# Patient Record
Sex: FEMALE | Race: OTHER | HISPANIC OR LATINO | URBAN - METROPOLITAN AREA
[De-identification: names, ages, dates, MRNs, and addresses within clinical notes are randomized per-mention and may not be internally consistent; named-entity substitution may affect disease eponyms.]

---

## 2020-03-02 ENCOUNTER — EMERGENCY (EMERGENCY)
Facility: HOSPITAL | Age: 75
LOS: 1 days | Discharge: HOME | End: 2020-03-02
Admitting: EMERGENCY MEDICINE
Payer: MEDICARE

## 2020-03-02 VITALS
WEIGHT: 134.92 LBS | HEART RATE: 86 BPM | TEMPERATURE: 98 F | HEIGHT: 62 IN | DIASTOLIC BLOOD PRESSURE: 77 MMHG | RESPIRATION RATE: 18 BRPM | OXYGEN SATURATION: 94 % | SYSTOLIC BLOOD PRESSURE: 146 MMHG

## 2020-03-02 VITALS
RESPIRATION RATE: 17 BRPM | OXYGEN SATURATION: 95 % | SYSTOLIC BLOOD PRESSURE: 137 MMHG | TEMPERATURE: 98 F | HEART RATE: 79 BPM | DIASTOLIC BLOOD PRESSURE: 79 MMHG

## 2020-03-02 LAB
FLU A RESULT: SIGNIFICANT CHANGE UP
FLU A RESULT: SIGNIFICANT CHANGE UP
FLUAV AG NPH QL: SIGNIFICANT CHANGE UP
FLUBV AG NPH QL: SIGNIFICANT CHANGE UP
RSV RESULT: SIGNIFICANT CHANGE UP
RSV RNA RESP QL NAA+PROBE: SIGNIFICANT CHANGE UP

## 2020-03-02 PROCEDURE — 73620 X-RAY EXAM OF FOOT: CPT | Mod: 26,RT

## 2020-03-02 PROCEDURE — 99283 EMERGENCY DEPT VISIT LOW MDM: CPT

## 2020-03-02 RX ORDER — IBUPROFEN 200 MG
600 TABLET ORAL ONCE
Refills: 0 | Status: COMPLETED | OUTPATIENT
Start: 2020-03-02 | End: 2020-03-02

## 2020-03-02 RX ORDER — ACETAMINOPHEN 500 MG
650 TABLET ORAL ONCE
Refills: 0 | Status: COMPLETED | OUTPATIENT
Start: 2020-03-02 | End: 2020-03-02

## 2020-03-02 RX ADMIN — Medication 600 MILLIGRAM(S): at 13:30

## 2020-03-02 RX ADMIN — Medication 650 MILLIGRAM(S): at 13:31

## 2020-03-02 NOTE — ED PROVIDER NOTE - NSFOLLOWUPINSTRUCTIONS_ED_ALL_ED_FT
Follow up with your primary care doctor or clinics listed below if you do not have a doctor  Return immediately for any new or worsening symptoms or any new concerns

## 2020-03-02 NOTE — ED ADULT NURSE NOTE - OBJECTIVE STATEMENT
Pt presents to ED c/o generalized body aches, headaches, runny shai and wound to right foot side of first digit.

## 2020-03-02 NOTE — ED PROVIDER NOTE - CARE PLAN
Principal Discharge DX:	Bunion of great toe Principal Discharge DX:	Bunion of great toe  Assessment and plan of treatment:	r/o osteo   flu   reassess

## 2020-03-02 NOTE — ED PROVIDER NOTE - CLINICAL SUMMARY MEDICAL DECISION MAKING FREE TEXT BOX
73 y/o female multiple medical complaints  poor historian   Looks well     will r/o flu   multiple cental caries. bunion on right great toe will r/o osteo 75 y/o female multiple medical complaints  poor historian   Looks well     right foot bunion   no swelling describes pain    will r/o flu   multiple cental caries. bunion on right great toe will r/o osteo

## 2020-03-02 NOTE — ED PROVIDER NOTE - PATIENT PORTAL LINK FT
You can access the FollowMyHealth Patient Portal offered by [facility] by registering at the following website: [email or web address]. By joining [employer]’s FollowMyHealth portal, you will also be able to view your health information using other applications (apps) compatible with our system.

## 2020-03-02 NOTE — ED PROVIDER NOTE - OBJECTIVE STATEMENT
75 y/o female denies hx c/o right foot pain, dental pain, and bodyaches with headache for multiple days. Patient states she just arrived from [place] four days ago. denies chest pain,nausea,vomiting,diarrhea,fevers,chills,sob,trauma,loc. Patient is poor historian. unknown if in shelter

## 2020-03-06 DIAGNOSIS — M21.611 BUNION OF RIGHT FOOT: ICD-10-CM

## 2020-03-06 DIAGNOSIS — M79.671 PAIN IN RIGHT FOOT: ICD-10-CM

## 2020-03-06 DIAGNOSIS — R51 HEADACHE: ICD-10-CM

## 2020-03-07 ENCOUNTER — EMERGENCY (EMERGENCY)
Facility: HOSPITAL | Age: 75
LOS: 1 days | Discharge: HOME | End: 2020-03-07
Admitting: EMERGENCY MEDICINE
Payer: MEDICARE

## 2020-03-07 VITALS
OXYGEN SATURATION: 98 % | HEIGHT: 62 IN | HEART RATE: 91 BPM | DIASTOLIC BLOOD PRESSURE: 73 MMHG | RESPIRATION RATE: 18 BRPM | WEIGHT: 134.92 LBS | TEMPERATURE: 99 F | SYSTOLIC BLOOD PRESSURE: 117 MMHG

## 2020-03-07 DIAGNOSIS — M54.2 CERVICALGIA: ICD-10-CM

## 2020-03-07 PROCEDURE — 99284 EMERGENCY DEPT VISIT MOD MDM: CPT

## 2020-03-07 RX ORDER — LIDOCAINE 4 G/100G
1 CREAM TOPICAL ONCE
Refills: 0 | Status: COMPLETED | OUTPATIENT
Start: 2020-03-07 | End: 2020-03-07

## 2020-03-07 RX ORDER — KETOROLAC TROMETHAMINE 30 MG/ML
15 SYRINGE (ML) INJECTION ONCE
Refills: 0 | Status: DISCONTINUED | OUTPATIENT
Start: 2020-03-07 | End: 2020-03-07

## 2020-03-07 RX ORDER — ACETAMINOPHEN 500 MG
1 TABLET ORAL
Qty: 20 | Refills: 0
Start: 2020-03-07

## 2020-03-07 RX ADMIN — LIDOCAINE 1 PATCH: 4 CREAM TOPICAL at 16:27

## 2020-03-07 RX ADMIN — Medication 15 MILLIGRAM(S): at 16:15

## 2020-03-07 NOTE — ED PROVIDER NOTE - CLINICAL SUMMARY MEDICAL DECISION MAKING FREE TEXT BOX
neck pain appears to be similar to be previous episodes, hx herniated discs neck, no numbness or weakness, no neuro/motor deficits. denies trauma.
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
## 2020-03-07 NOTE — ED PROVIDER NOTE - PATIENT PORTAL LINK FT
You can access the FollowMyHealth Patient Portal offered by [facility] by registering at the following website: [email or web address]. By joining [employer]’s FollowMyHealth portal, you will also be able to view your health information using other applications (apps) compatible with our system.

## 2020-03-07 NOTE — ED PROVIDER NOTE - PHYSICAL EXAMINATION
VITAL SIGNS: I have reviewed nursing notes and confirm.  CONSTITUTIONAL: Well-developed; unkempt; in no acute distress.  SKIN: Skin is warm and dry, no acute rash.  ENT: No nasal discharge; airway clear.  NECK: Supple; non tender. No midline cervical tenderness or stepoff.   CARD: S1, S2 normal; no murmurs, gallops, or rubs. Regular rate and rhythm.  RESP: No wheezes, rales or rhonchi.  EXT: Normal ROM x4. No clubbing, cyanosis or edema.  NEURO: Alert, oriented. Grossly unremarkable.  PSYCH: Cooperative, appropriate. VITAL SIGNS: I have reviewed nursing notes and confirm.  	CONSTITUTIONAL: Well-developed; well-nourished; in no acute distress.  	SKIN: Skin is warm and dry, no acute rash.  	HEAD: Normocephalic; atraumatic.  	EYES: PERRL, EOM intact; conjunctiva and sclera clear.  	ENT: No nasal discharge; airway clear.  no tonsillar swelling or exudates, uvula midline, airway patent  	NECK: Supple; no midline  tenderness. +soft tissue paraspinal muscle tenderness. pain reproducible with movement of neck.   	CARD: S1, S2 normal; no murmurs, gallops, or rubs. Regular rate and rhythm.  	RESP: No wheezes, rales or rhonchi.  	ABD: soft; non-distended; non-tender  	EXT: Normal ROM x 4  	NEURO: Alert, oriented. Grossly unremarkable.  PSYCH: Cooperative, appropriate.
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
## 2020-03-07 NOTE — ED PROVIDER NOTE - CARE PROVIDER_API CALL
Michele Nicholas)  Pain Medicine; PhysicalRehab Medicine  [address] [place] [address]  Phone: [phone]  Fax: [phone]  Follow Up Time:

## 2022-05-29 ENCOUNTER — INPATIENT (INPATIENT)
Facility: HOSPITAL | Age: 77
LOS: 2 days | Discharge: NURSING, REHAB OR LONG-TERM CARE | DRG: 603 | End: 2022-06-01
Attending: HOSPITALIST | Admitting: HOSPITALIST
Payer: MEDICARE

## 2022-05-29 VITALS
TEMPERATURE: 98 F | RESPIRATION RATE: 16 BRPM | HEART RATE: 81 BPM | DIASTOLIC BLOOD PRESSURE: 85 MMHG | OXYGEN SATURATION: 99 % | HEIGHT: 62 IN | WEIGHT: 125 LBS | SYSTOLIC BLOOD PRESSURE: 152 MMHG

## 2022-05-29 DIAGNOSIS — Z00.00 ENCOUNTER FOR GENERAL ADULT MEDICAL EXAMINATION WITHOUT ABNORMAL FINDINGS: ICD-10-CM

## 2022-05-29 DIAGNOSIS — L03.90 CELLULITIS, UNSPECIFIED: ICD-10-CM

## 2022-05-29 LAB
ALBUMIN SERPL ELPH-MCNC: 4 G/DL — SIGNIFICANT CHANGE UP (ref 3.3–5)
ALP SERPL-CCNC: 152 U/L — HIGH (ref 40–120)
ALT FLD-CCNC: 25 U/L — SIGNIFICANT CHANGE UP (ref 10–45)
ANION GAP SERPL CALC-SCNC: 10 MMOL/L — SIGNIFICANT CHANGE UP (ref 5–17)
APTT BLD: 28.5 SEC — SIGNIFICANT CHANGE UP (ref 27.5–35.5)
AST SERPL-CCNC: 29 U/L — SIGNIFICANT CHANGE UP (ref 10–40)
BASOPHILS # BLD AUTO: 0.07 K/UL — SIGNIFICANT CHANGE UP (ref 0–0.2)
BASOPHILS NFR BLD AUTO: 0.7 % — SIGNIFICANT CHANGE UP (ref 0–2)
BILIRUB SERPL-MCNC: 0.3 MG/DL — SIGNIFICANT CHANGE UP (ref 0.2–1.2)
BUN SERPL-MCNC: 22 MG/DL — SIGNIFICANT CHANGE UP (ref 7–23)
CALCIUM SERPL-MCNC: 9.5 MG/DL — SIGNIFICANT CHANGE UP (ref 8.4–10.5)
CHLORIDE SERPL-SCNC: 102 MMOL/L — SIGNIFICANT CHANGE UP (ref 96–108)
CO2 SERPL-SCNC: 24 MMOL/L — SIGNIFICANT CHANGE UP (ref 22–31)
CREAT SERPL-MCNC: 0.68 MG/DL — SIGNIFICANT CHANGE UP (ref 0.5–1.3)
EGFR: 91 ML/MIN/1.73M2 — SIGNIFICANT CHANGE UP
EOSINOPHIL # BLD AUTO: 0.08 K/UL — SIGNIFICANT CHANGE UP (ref 0–0.5)
EOSINOPHIL NFR BLD AUTO: 0.9 % — SIGNIFICANT CHANGE UP (ref 0–6)
GLUCOSE SERPL-MCNC: 92 MG/DL — SIGNIFICANT CHANGE UP (ref 70–99)
HCT VFR BLD CALC: 37.6 % — SIGNIFICANT CHANGE UP (ref 34.5–45)
HGB BLD-MCNC: 11.6 G/DL — SIGNIFICANT CHANGE UP (ref 11.5–15.5)
IMM GRANULOCYTES NFR BLD AUTO: 0.6 % — SIGNIFICANT CHANGE UP (ref 0–1.5)
INR BLD: 1.09 — SIGNIFICANT CHANGE UP (ref 0.88–1.16)
LACTATE SERPL-SCNC: 1.3 MMOL/L — SIGNIFICANT CHANGE UP (ref 0.5–2)
LYMPHOCYTES # BLD AUTO: 2.02 K/UL — SIGNIFICANT CHANGE UP (ref 1–3.3)
LYMPHOCYTES # BLD AUTO: 21.6 % — SIGNIFICANT CHANGE UP (ref 13–44)
MCHC RBC-ENTMCNC: 28.8 PG — SIGNIFICANT CHANGE UP (ref 27–34)
MCHC RBC-ENTMCNC: 30.9 GM/DL — LOW (ref 32–36)
MCV RBC AUTO: 93.3 FL — SIGNIFICANT CHANGE UP (ref 80–100)
MONOCYTES # BLD AUTO: 1.02 K/UL — HIGH (ref 0–0.9)
MONOCYTES NFR BLD AUTO: 10.9 % — SIGNIFICANT CHANGE UP (ref 2–14)
NEUTROPHILS # BLD AUTO: 6.09 K/UL — SIGNIFICANT CHANGE UP (ref 1.8–7.4)
NEUTROPHILS NFR BLD AUTO: 65.3 % — SIGNIFICANT CHANGE UP (ref 43–77)
NRBC # BLD: 0 /100 WBCS — SIGNIFICANT CHANGE UP (ref 0–0)
PLATELET # BLD AUTO: 421 K/UL — HIGH (ref 150–400)
POTASSIUM SERPL-MCNC: 4.1 MMOL/L — SIGNIFICANT CHANGE UP (ref 3.5–5.3)
POTASSIUM SERPL-SCNC: 4.1 MMOL/L — SIGNIFICANT CHANGE UP (ref 3.5–5.3)
PROT SERPL-MCNC: 8.5 G/DL — HIGH (ref 6–8.3)
PROTHROM AB SERPL-ACNC: 13 SEC — SIGNIFICANT CHANGE UP (ref 10.5–13.4)
RBC # BLD: 4.03 M/UL — SIGNIFICANT CHANGE UP (ref 3.8–5.2)
RBC # FLD: 13.3 % — SIGNIFICANT CHANGE UP (ref 10.3–14.5)
SARS-COV-2 RNA SPEC QL NAA+PROBE: NEGATIVE — SIGNIFICANT CHANGE UP
SODIUM SERPL-SCNC: 136 MMOL/L — SIGNIFICANT CHANGE UP (ref 135–145)
WBC # BLD: 9.34 K/UL — SIGNIFICANT CHANGE UP (ref 3.8–10.5)
WBC # FLD AUTO: 9.34 K/UL — SIGNIFICANT CHANGE UP (ref 3.8–10.5)

## 2022-05-29 PROCEDURE — 99223 1ST HOSP IP/OBS HIGH 75: CPT | Mod: GC

## 2022-05-29 PROCEDURE — 99285 EMERGENCY DEPT VISIT HI MDM: CPT | Mod: FS

## 2022-05-29 RX ORDER — FAMOTIDINE 10 MG/ML
20 INJECTION INTRAVENOUS ONCE
Refills: 0 | Status: DISCONTINUED | OUTPATIENT
Start: 2022-05-29 | End: 2022-05-29

## 2022-05-29 RX ORDER — FAMOTIDINE 10 MG/ML
20 INJECTION INTRAVENOUS DAILY
Refills: 0 | Status: DISCONTINUED | OUTPATIENT
Start: 2022-05-29 | End: 2022-06-01

## 2022-05-29 RX ORDER — DIPHENHYDRAMINE HCL 50 MG
25 CAPSULE ORAL ONCE
Refills: 0 | Status: DISCONTINUED | OUTPATIENT
Start: 2022-05-29 | End: 2022-05-29

## 2022-05-29 RX ORDER — ACETAMINOPHEN 500 MG
975 TABLET ORAL ONCE
Refills: 0 | Status: COMPLETED | OUTPATIENT
Start: 2022-05-29 | End: 2022-05-29

## 2022-05-29 RX ORDER — DIPHENHYDRAMINE HCL 50 MG
25 CAPSULE ORAL ONCE
Refills: 0 | Status: COMPLETED | OUTPATIENT
Start: 2022-05-29 | End: 2022-05-29

## 2022-05-29 RX ORDER — VANCOMYCIN HCL 1 G
1250 VIAL (EA) INTRAVENOUS ONCE
Refills: 0 | Status: COMPLETED | OUTPATIENT
Start: 2022-05-29 | End: 2022-05-29

## 2022-05-29 RX ORDER — PIPERACILLIN AND TAZOBACTAM 4; .5 G/20ML; G/20ML
3.38 INJECTION, POWDER, LYOPHILIZED, FOR SOLUTION INTRAVENOUS ONCE
Refills: 0 | Status: COMPLETED | OUTPATIENT
Start: 2022-05-29 | End: 2022-05-29

## 2022-05-29 RX ORDER — ACETAMINOPHEN 500 MG
650 TABLET ORAL EVERY 6 HOURS
Refills: 0 | Status: DISCONTINUED | OUTPATIENT
Start: 2022-05-29 | End: 2022-06-01

## 2022-05-29 RX ORDER — INFLUENZA VIRUS VACCINE 15; 15; 15; 15 UG/.5ML; UG/.5ML; UG/.5ML; UG/.5ML
0.7 SUSPENSION INTRAMUSCULAR ONCE
Refills: 0 | Status: DISCONTINUED | OUTPATIENT
Start: 2022-05-29 | End: 2022-06-01

## 2022-05-29 RX ADMIN — Medication 975 MILLIGRAM(S): at 19:57

## 2022-05-29 RX ADMIN — Medication 1250 MILLIGRAM(S): at 21:04

## 2022-05-29 RX ADMIN — Medication 975 MILLIGRAM(S): at 20:56

## 2022-05-29 RX ADMIN — PIPERACILLIN AND TAZOBACTAM 3.38 GRAM(S): 4; .5 INJECTION, POWDER, LYOPHILIZED, FOR SOLUTION INTRAVENOUS at 20:56

## 2022-05-29 RX ADMIN — Medication 25 MILLIGRAM(S): at 21:16

## 2022-05-29 RX ADMIN — FAMOTIDINE 20 MILLIGRAM(S): 10 INJECTION INTRAVENOUS at 21:16

## 2022-05-29 RX ADMIN — PIPERACILLIN AND TAZOBACTAM 200 GRAM(S): 4; .5 INJECTION, POWDER, LYOPHILIZED, FOR SOLUTION INTRAVENOUS at 19:57

## 2022-05-29 RX ADMIN — Medication 166.67 MILLIGRAM(S): at 20:55

## 2022-05-29 NOTE — H&P ADULT - PROBLEM SELECTOR PLAN 5
F: None   E: replet as needed   N: regular   DVT ppx:   GI ppx: Famotidine  Dispo: [facility]  CODE: DNR/DNI F: None   E: replet as needed   N: regular   DVT ppx: Hold Xarelto and ASA pending CT leg to r/o abscess and decision by podiatry   GI ppx: Famotidine  Dispo: [facility]  CODE: DNR/DNI F: None   E: replet as needed   N: regular   DVT ppx: Hold Xarelto and ASA pending CT leg to r/o abscess and decision by podiatry   GI ppx: Famotidine  Dispo: [facility]    GOC: FULL CODE in the chart from nursing home but pt requested to review MOLTS to make a decission for being DNR/DNI. Patient is not reliable to decesion and right now pt is FULL CODE until sign the MOLTS and being seen by psych in AM.

## 2022-05-29 NOTE — H&P ADULT - PROBLEM SELECTOR PLAN 1
Hx of venous stasis and non pressure ulcer and cellulitis and ? osteomyelitis for unknown duration, was getting CTX start date 5/23 for 10 days by [facility] . Presented to the ED due to increase pain and unable to walk. reported fever at rehab but in ED, afebrie and no leukocytosis. Do not meet SIRS criteria. elevated CRP and ESR on admission.   Was seen by wound care ppls in rehab, per wound care note: non pressure venous ulcer on Left leg anterion, medial upper, medial lower, lateral left leg treated with santyl, mupirocin, alginate and CTX 1gr daily   -f/u BCX   -f/u wound culture   -s/p zosyn and vanc in ED but had skin itchiness/redness and cold during vanc infusion, so vanc stopped. Pt requested PO antibiotics -------  -c/w Bactrim ans zosyn  -f/u Podiatry recs in AM  -ID consult if needed in AM  -Pt refused to do the CT leg and xray leg in ED. will attempt in AM Hx of venous stasis and non pressure ulcer and cellulitis and ? osteomyelitis for unknown duration, was getting CTX start date 5/23 for 10 days by [facility] . Presented to the ED due to increase pain and unable to walk. reported fever at rehab but in ED, afebrie and no leukocytosis. Do not meet SIRS criteria. elevated CRP and ESR on admission.   Was seen by wound care ppls in rehab, per wound care note: non pressure venous ulcer on Left leg anterion, medial upper, medial lower, lateral left leg treated with santyl, mupirocin, alginate and CTX 1gr daily   -f/u BCX   -f/u wound culture   -s/p zosyn and vanc in ED but had skin itchiness/redness and cold during vanc infusion reported, so vanc stopped in the ED. Pt requested PO antibiotics isntead of IV antibiotics.     INCOMPELET+++++++      -c/w Bactrim/vanc  ans zosyn (cefepim if denies )  -f/u Podiatry recs in AM  -ID consult if needed in AM  -Pt refused to do the CT leg and xray leg in ED. will attempt in AM    Nec fasc, she does not allow us to assess  one dose clindamycin   Vascular call Hx of venous stasis and non pressure ulcer and cellulitis and ? osteomyelitis for unknown duration, was getting CTX start date 5/23 for 10 days by [facility] . Presented to the ED due to increase pain and unable to walk. reported fever at rehab but in ED, afebrie and no leukocytosis. Do not meet SIRS criteria. elevated CRP and ESR on admission.   Was seen by wound care ppls in rehab, per wound care note: non pressure venous ulcer on Left leg anterion, medial upper, medial lower, lateral left leg treated with santyl, mupirocin, alginate and CTX 1gr daily   -f/u BCX   -f/u wound culture   -s/p zosyn and vanc in ED but had skin itchiness/redness and cold during vanc infusion reported, so vanc stopped in the ED. Pt requested PO antibiotics isntead of IV antibiotics. Patient did not have sever reaction to Vanc, no SOB, no CP, no dizziness, HA at that time.   - Due to need for coverage MRSA wounds mallodure and puss will c/w Vancomycin RUN IT SLOW AND GIVE IV BENADRYL BEFORE VANC INFUSION.   - Cefepim 2gr q8hrs and ID consult for approval in AM   -f/u Podiatry recs (consulted)  -F/u Vascular surgery recs (consulted)  -Due to Pt refused to do the CT leg and xray leg in ED and leg exam limited due to pain and pt uncooperation, unable to r/o Fasc necrotizing/cripticus at this time, pending Vascular exam   -Clindamycin  ?????? Hx of venous stasis and non pressure ulcer and cellulitis and ? osteomyelitis for unknown duration, was getting CTX start date 5/23 for 10 days by [facility] . Presented to the ED due to increase pain and unable to walk. reported fever at rehab but in ED, afebrie and no leukocytosis. Do not meet SIRS criteria. elevated CRP and ESR on admission.   Was seen by wound care ppls in rehab, per wound care note: non pressure venous ulcer on Left leg anterion, medial upper, medial lower, lateral left leg treated with santyl, mupirocin, alginate and CTX 1gr daily   -f/u BCX  and wound culture sent by surgery  -s/p zosyn and vanc in ED but had skin itchiness/redness and cold during vanc infusion reported, so vanc stopped in the ED. Pt requested PO antibiotics isntead of IV antibiotics. Patient did not have sever reaction to Vanc, no SOB, no CP, no dizziness, HA at that time.   - Due to need for coverage MRSA wounds malodure and purulent wound, will c/w Vancomycin   - Vancomycin: RUN IT SLOW in 2 hours AND GIVE IV BENADRYL BEFORE VANC INFUSION.   - Cefepim 2gr q8hrs. One dose given pending ID consult approval in AM   -s/p Clindamycin 900mg IVP one dose till r/o Nec fasci  -f/u Podiatry recs (consulted)  -Vascular surgery on bedside, sent wound culture changed dressing. F/u Vascular surgery recs (consulted)  -Pt refused the CT leg and xray leg

## 2022-05-29 NOTE — H&P ADULT - NSHPPHYSICALEXAM_GEN_ALL_CORE
Constitutional: female/male, WDWN, NAD  HEENT: PERRL, EOMI, sclera non-icteric, neck supple, trachea midline, no masses, no JVD, MMM, good dentition  Respiratory: CTA b/l, good air entry b/l, no wheezing, no rhonchi, no rales, without accessory muscle use and no intercostal retractions  Cardiovascular: RRR, normal S1S2, no M/R/G  Gastrointestinal: soft, NTND, no masses palpable, BS normal  Extremities: Warm, well perfused, pulses equal bilateral upper and lower extremities, no edema, no clubbing  Neurological: AAOx3, CN Grossly intact  Skin: Normal temperature, warm, dry      LLE - multiple ulcers to left leg. left medial lower, left medial upper, left lateral leg. left medial lower leg with surrounding redness, warm to touch. active drainage from wounds to lower leg, purulent drainage, foul smelling. tenderness throughout, no crepitus on exam. 2+ DP pulses. good cap refill. Constitutional: female, WDWN, NAD  HEENT: PERRL, EOMI, sclera non-icteric, neck supple, poor dentition  Respiratory: CTA b/l, good air entry b/l, no wheezing, no rhonchi, no rales, without accessory muscle use and no intercostal retractions  Cardiovascular: Systolic murmur which is chronic to pt, normal S1S2, RRR  Gastrointestinal: soft, NTND, no masses palpable, BS normal  Extremities: Warm, well perfused, pulses equal bilateral upper and lower extremities, no edema, no clubbing  LLE, patient asked to do not touch her leg for examination.  visual exam: multiple ulcers to left leg. left medial lower, left medial upper, left lateral leg. left medial lower leg with surrounding redness,  wet and purulent drainage dressing, foul smelling. Unable to evaluate rest of exam due to pt denied for exam  Neurological: AAOx3, CN Grossly intact Constitutional: female, WDWN, NAD  HEENT: PERRL, EOMI, sclera non-icteric, neck supple, poor dentition  Respiratory: CTA b/l, good air entry b/l, no wheezing, no rhonchi, no rales, without accessory muscle use and no intercostal retractions  Cardiovascular: Systolic murmur which is chronic to pt, normal S1S2, RRR  Gastrointestinal: soft, NTND, no masses palpable, BS normal  Extremities: Warm, well perfused, pulses very weak PD, skin very thight and edematous. at first she refused exam, limited exam, after surgery came and morphine given, dressing was removed and   large size wet purulent wound foul smell and  multiple ulcers to left leg. left medial lower, left medial upper, left lateral leg. left medial lower leg with surrounding redness. No crepitus found on exam  Neurological: AAOx3, CN Grossly intact

## 2022-05-29 NOTE — H&P ADULT - NSHPLABSRESULTS_GEN_ALL_CORE
.  LABS:                         11.6   9.34  )-----------( 421      ( 29 May 2022 19:01 )             37.6     05-29    136  |  102  |  22  ----------------------------<  92  4.1   |  24  |  0.68    Ca    9.5      29 May 2022 19:01    TPro  8.5<H>  /  Alb  4.0  /  TBili  0.3  /  DBili  x   /  AST  29  /  ALT  25  /  AlkPhos  152<H>  05-29    PT/INR - ( 29 May 2022 19:01 )   PT: 13.0 sec;   INR: 1.09          PTT - ( 29 May 2022 19:01 )  PTT:28.5 sec          Lactate, Blood: 1.3 mmol/L (05-29 @ 19:01)      RADIOLOGY, EKG & ADDITIONAL TESTS: Reviewed.

## 2022-05-29 NOTE — ED PROVIDER NOTE - PROGRESS NOTE DETAILS
wbc count ok, ,   will consult podiatry.   pt having diffuse itching after getting vancofredricko dc'd given benadryl / pepcid.  confirmed no allergies listed multiple places on her rehab records.  written for IV meds but pt requesting oral.   reports she wants the same antibiotics she has been getting reviewed w podiatry. agree for admission for iv abx - broad spectrum  will see in morning.   for dressing - telfa non-adherant and kerlix.

## 2022-05-29 NOTE — H&P ADULT - NSHPSOCIALHISTORY_GEN_ALL_CORE
smokes 6-7 cigarres/day, drink ETOh occasionally    From psych note from rehab: Pt was born in [place]. [place] in [place] little kid, adopted and has no family because she was adopted. She's been  and has 3 kids, 2 works in [place] and one works in [employer]. She went to [employer], and [employer] graduated with bachor's degree. 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 smokes 6-7 cigarres/day, drink ETOh occasionally

## 2022-05-29 NOTE — ED ADULT NURSE REASSESSMENT NOTE - NS ED NURSE REASSESS COMMENT FT1
pt developed itching/rashes to back, chest and arm 10 mins after Vancomycin was started via IV pump; denies sob, no wheezing; evaluated by CARLOS White  - Benadryl PO and pepcid PO given as ordered; Vancomycin d/c'd at this time; will continue to monitor

## 2022-05-29 NOTE — H&P ADULT - PROBLEM SELECTOR PLAN 4
AAox4.  was seen by psych at rehab, has been stable.   Melatonin for isomnia AAox4.  was seen by psych at rehab, has been stable. From psych note from rehab: Pt was born in [place]. [place] in [place] little kid, adopted and has no family because she was adopted. She's been  and has 3 kids, 2 works in [place] and one works in [employer]. She went to [employer], and [employer] [place] graduated with bachor's degree. 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   -Psych winnieal in AM   -Melatonin for insomnia PRN AAox4.  was seen by psych at rehab, has been stable. Patient is very difficult to deal with.  From psych note from rehab: Pt was born in [place]. [place] in [place] as little kid, adopted and has no family because she was adopted. She's been  and has 3 kids, 2 works in [place] and one works in [employer]. She went to [employer], and [employer] graduated with bachor's degree. 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   -Psych eval in AM   -Melatonin for insomnia PRN

## 2022-05-29 NOTE — ED PROVIDER NOTE - CARDIAC, MLM
Normal rate, regular rhythm.  extremities warm and well perfused. 2+ radial and DP pulses. good cap refill.

## 2022-05-29 NOTE — ED PROVIDER NOTE - MUSCULOSKELETAL, MLM
range of motion is not limited, no muscle or joint tenderness. see above for LLE exam. PICC line in place to LUE.

## 2022-05-29 NOTE — ED PROVIDER NOTE - OBJECTIVE STATEMENT
76 yr old female, history of osteo, adjustment disorder, presents to the Emergency Department with leg wounds. Pt has multiple wounds to LLE reported to be non-pressure ulcers, no hx of diabetes noted.   [facility] has been on IV ceftriaxone daily since 5/23.   in last 24-48 hours pt w increased pain, subjective fevers, foul smell from wounds.   Pt reports she has been doing wound care as instructed.   Per note exam from rehab doctor on 5/26 wounds had scant amount of drainage, no odor.

## 2022-05-29 NOTE — H&P ADULT - PROBLEM SELECTOR PLAN 3
Plt 421, Hb 11.6 on admission   Home med :Xarelto 10mg daily Plt 421, Hb 11.6 on admission   Home med :Xarelto 10mg daily, ASA 81 daily  -hold home meds pending CT leg to f/o abscess and making dicission by podiatry for debridement Plt 421, Hb 11.6 on admission   Home med :Xarelto 10mg daily, ASA 81 daily  -hold home meds pending CT leg to f/o abscess and making decision by podiatry for debridement  -hold Plt 421, Hb 11.6 on admission . unclear where is the thrombophlebitis  Home med :Xarelto 10mg daily, ASA 81 daily  -hold home meds Xarelto and ASA, pending CT leg to f/o abscess and making decision by surgery and podiatry for debridement  -hold LE US doppler due to pain and pt is not cooperative

## 2022-05-29 NOTE — ED PROVIDER NOTE - CLINICAL SUMMARY MEDICAL DECISION MAKING FREE TEXT BOX
pt w known osteo now here w increased pain, foul smell, subjective fevers despite outpt abx regimen. no crepitus on exam, not concerned for nec fasc  not febrile, vitals ok. pt overall well appearing.  will get XR   labs w esr / crp.   plan for admission for broad spectrum abx.   will consult podiatry for recs.

## 2022-05-29 NOTE — PATIENT PROFILE ADULT - FALL HARM RISK - HARM RISK INTERVENTIONS

## 2022-05-29 NOTE — ED ADULT TRIAGE NOTE - CHIEF COMPLAINT QUOTE
Pt sent in from nursing home for left foot infection x months. Pt receiving antibiotics via PICC line but reports no relief of symptoms. Unable to ambulate. Denies fevers, vomiting.

## 2022-05-29 NOTE — H&P ADULT - HISTORY OF PRESENT ILLNESS
76 yr old female, history of osteo, adjustment disorder, presents to the Emergency Department with leg wounds. Pt has multiple wounds to LLE reported to be non-pressure ulcers, no hx of diabetes noted.   	[facility] has been on IV ceftriaxone daily since 5/23.   	in last 24-48 hours pt w increased pain, subjective fevers, foul smell from wounds.   	Pt reports she has been doing wound care as instructed.   Per note exam from rehab doctor on 5/26 wounds had scant amount of drainage, no odor    wbc count ok, ,   will consult podiatry.   pt having diffuse itching after getting vanco, vanco dc'd given benadryl / pepcid.  confirmed no allergies listed multiple places on her rehab records.  written for IV meds but pt requesting oral.   reports she wants the same antibiotics she has been getting.   reviewed w podiatry. agree for admission for iv abx - broad spectrum  will see in morning.   for dressing - telfa non-adherant and kerlix    ED course:   VS:   WBC 9.34, Hb 11.6, Plt 421, Na 136, K 4.1, BUN 22, Cr 0.68, Pr 8.5 Alkph 152, .7 , [place] 1.3,   Imaging:  Treatment:    Pt is 76 yr old female, history of osteomyelitis (unclear for how long), adjustment disorder/anxiety, presents to the Emergency Department [facility], with leg wounds. Pt has multiple wounds to LLE reported to be non-pressure ulcers. Patient is very difficult to get information. 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 No hx of diabetes noted. Per notes, she has been on IV ceftriaxone daily with PICCLIne since 5/23 total 10 days. In last 24-48 hours pt with increased pain, subjective fevers, foul smell from wounds. Pt reports she has been doing wound care as instructed. Per rehab documents, wound care used to see her at rehab and all three wounds on Left anterior leg/left medical upper leg, left medial lower leg and left lateral leg was treated by  Santyl, mupirocin and alginate.  Per note exam from rehab doctor on 5/26 wounds had scant amount of drainage, no odor  -In the ED when pt was getting vancomycin, she was having diffuse itching and redness and cold, vanco dc'd given benadryl / pepcid. In the ED, confirmed no allergies listed multiple places on her rehab records.    -In the ED, podiatry consulted and reviewed w podiatry. They agree for admission for iv abx - broad spectrum under medicine and they will see pt in AM. for dressing - telfa non-adherant and kerlix    Patient denies HA, vision changes, SOB, CP, abdominal pain, urinary/BM changes. Just has complain of itchiness,   ED course:   VS: Tmax: 98.5, HR 81 /85, RR 16/99% RA.   WBC 9.34, Hb 11.6, Plt 421, Na 136, K 4.1, BUN 22, Cr 0.68, Pr 8.5 Alkph 152, .7 , [place] 1.3,   Imaging: Was not done in the ED, since pt refused.   Treatment: S/p Vanc 1250 and Zosyn in ED , tylenol, Benadryl for reaction to vanc.    Pt is 76 yr old female, history of osteomyelitis (unclear for how long), adjustment disorder/anxiety, presents to the Emergency Department from [facility], with leg wounds. Pt has multiple wounds to LLE reported to be non-pressure ulcers. Patient is very difficult to get information. 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 No hx of diabetes noted. Per notes, she has been on IV ceftriaxone daily with PICCLIne since 5/23 total 10 days. In last 24-48 hours pt with increased pain, subjective fevers, foul smell from wounds. Pt reports she has been doing wound care as instructed. Per rehab documents, wound care used to see her at rehab and all three wounds on Left anterior leg/left medical upper leg, left medial lower leg and left lateral leg was treated by  Santyl, mupirocin and alginate.  Per note exam from rehab doctor on 5/26 wounds had scant amount of drainage, no odor  -In the ED when pt was getting vancomycin, she was having diffuse itching and redness and cold, vanco dc'd given benadryl / pepcid. In the ED, confirmed no allergies listed multiple places on her rehab records.    -In the ED, podiatry consulted and reviewed w podiatry. They agree for admission for iv abx - broad spectrum under medicine and they will see pt in AM. for dressing - telfa non-adherant and kerlix    Patient denies HA, vision changes, SOB, CP, abdominal pain, urinary/BM changes. Just has complain of itchiness,   ED course:   VS: Tmax: 98.5, HR 81 /85, RR 16/99% RA.   WBC 9.34, Hb 11.6, Plt 421, Na 136, K 4.1, BUN 22, Cr 0.68, Pr 8.5 Alkph 152, .7 , [place] 1.3,   Imaging: Was not done in the ED, since pt refused. Refused ECG   Treatment: S/p Vanc 1250 and Zosyn in ED , tylenol, Benadryl for reaction to vanc.

## 2022-05-29 NOTE — ED ADULT NURSE NOTE - OBJECTIVE STATEMENT
Pt has multiple ulcers that are foul smelling to bilateral LE, reports getting treatment at [facility] "but it's not working" also reports taking tylenol "around the clock but I still keep getting a fever."

## 2022-05-29 NOTE — ED PROVIDER NOTE - NS ED ATTENDING STATEMENT MOD
This was a shared visit with the CAROLYNN. I reviewed and verified the documentation and independently performed the documented:

## 2022-05-29 NOTE — H&P ADULT - ATTENDING COMMENTS
#RLE Cellulitis: multile RLE wounds, malodorus, elevated ESR/CRP c/f OM; lactate wnl, f/up CK. Low c/f nec fasc based on limited exam however will have vascular asses, +decreased pulses bilaterally. Pt agreeable to Vanc/Cefepime. F/up CT LE, blood cx. Vascular surgery recs.

## 2022-05-29 NOTE — H&P ADULT - ASSESSMENT
Pt is 76 yr old female, history of osteomyelitis (unclear for how long), adjustment disorder off from meds, presents to the Emergency Department from [facility] [place] [facility], with leg wounds was on CTX IV for cellulitis, admitted to the [facility] for worsening Cellulitis management

## 2022-05-29 NOTE — ED PROVIDER NOTE - PHYSICAL EXAMINATION
LLE - multiple ulcers to left leg. left medial lower, left medial upper, left lateral leg. left medial lower leg with surrounding redness, warm to touch. active drainage from wounds to lower leg, purulent drainage, foul smelling. 2+ DP pulses. good cap refill. LLE - multiple ulcers to left leg. left medial lower, left medial upper, left lateral leg. left medial lower leg with surrounding redness, warm to touch. active drainage from wounds to lower leg, purulent drainage, foul smelling. tenderness throughout, no crepitus on exam. 2+ DP pulses. good cap refill.

## 2022-05-30 DIAGNOSIS — F43.22 ADJUSTMENT DISORDER WITH ANXIETY: ICD-10-CM

## 2022-05-30 DIAGNOSIS — I80.9 PHLEBITIS AND THROMBOPHLEBITIS OF UNSPECIFIED SITE: ICD-10-CM

## 2022-05-30 DIAGNOSIS — I87.2 VENOUS INSUFFICIENCY (CHRONIC) (PERIPHERAL): ICD-10-CM

## 2022-05-30 DIAGNOSIS — U07.1 COVID-19: ICD-10-CM

## 2022-05-30 PROBLEM — M50.20 OTHER CERVICAL DISC DISPLACEMENT, UNSPECIFIED CERVICAL REGION: Chronic | Status: ACTIVE | Noted: 2020-03-07

## 2022-05-30 LAB
ALBUMIN SERPL ELPH-MCNC: 3.4 G/DL — SIGNIFICANT CHANGE UP (ref 3.3–5)
ALP SERPL-CCNC: 120 U/L — SIGNIFICANT CHANGE UP (ref 40–120)
ALT FLD-CCNC: 18 U/L — SIGNIFICANT CHANGE UP (ref 10–45)
ANION GAP SERPL CALC-SCNC: 9 MMOL/L — SIGNIFICANT CHANGE UP (ref 5–17)
AST SERPL-CCNC: 16 U/L — SIGNIFICANT CHANGE UP (ref 10–40)
BASOPHILS # BLD AUTO: 0.05 K/UL — SIGNIFICANT CHANGE UP (ref 0–0.2)
BASOPHILS NFR BLD AUTO: 0.6 % — SIGNIFICANT CHANGE UP (ref 0–2)
BILIRUB SERPL-MCNC: 0.4 MG/DL — SIGNIFICANT CHANGE UP (ref 0.2–1.2)
BUN SERPL-MCNC: 17 MG/DL — SIGNIFICANT CHANGE UP (ref 7–23)
CALCIUM SERPL-MCNC: 9.1 MG/DL — SIGNIFICANT CHANGE UP (ref 8.4–10.5)
CHLORIDE SERPL-SCNC: 102 MMOL/L — SIGNIFICANT CHANGE UP (ref 96–108)
CK SERPL-CCNC: 57 U/L — SIGNIFICANT CHANGE UP (ref 25–170)
CO2 SERPL-SCNC: 23 MMOL/L — SIGNIFICANT CHANGE UP (ref 22–31)
CREAT SERPL-MCNC: 0.7 MG/DL — SIGNIFICANT CHANGE UP (ref 0.5–1.3)
CRP SERPL-MCNC: 95.8 MG/L — HIGH (ref 0–4)
EGFR: 91 ML/MIN/1.73M2 — SIGNIFICANT CHANGE UP
EOSINOPHIL # BLD AUTO: 0.09 K/UL — SIGNIFICANT CHANGE UP (ref 0–0.5)
EOSINOPHIL NFR BLD AUTO: 1 % — SIGNIFICANT CHANGE UP (ref 0–6)
ERYTHROCYTE [SEDIMENTATION RATE] IN BLOOD: 66 MM/HR — HIGH
GLUCOSE SERPL-MCNC: 104 MG/DL — HIGH (ref 70–99)
GRAM STN FLD: SIGNIFICANT CHANGE UP
HCT VFR BLD CALC: 33.9 % — LOW (ref 34.5–45)
HCV AB S/CO SERPL IA: 0.03 S/CO — SIGNIFICANT CHANGE UP
HCV AB SERPL-IMP: SIGNIFICANT CHANGE UP
HGB BLD-MCNC: 10.7 G/DL — LOW (ref 11.5–15.5)
IMM GRANULOCYTES NFR BLD AUTO: 0.9 % — SIGNIFICANT CHANGE UP (ref 0–1.5)
LYMPHOCYTES # BLD AUTO: 1.49 K/UL — SIGNIFICANT CHANGE UP (ref 1–3.3)
LYMPHOCYTES # BLD AUTO: 17.2 % — SIGNIFICANT CHANGE UP (ref 13–44)
MAGNESIUM SERPL-MCNC: 2 MG/DL — SIGNIFICANT CHANGE UP (ref 1.6–2.6)
MCHC RBC-ENTMCNC: 29.2 PG — SIGNIFICANT CHANGE UP (ref 27–34)
MCHC RBC-ENTMCNC: 31.6 GM/DL — LOW (ref 32–36)
MCV RBC AUTO: 92.6 FL — SIGNIFICANT CHANGE UP (ref 80–100)
MONOCYTES # BLD AUTO: 0.53 K/UL — SIGNIFICANT CHANGE UP (ref 0–0.9)
MONOCYTES NFR BLD AUTO: 6.1 % — SIGNIFICANT CHANGE UP (ref 2–14)
NEUTROPHILS # BLD AUTO: 6.42 K/UL — SIGNIFICANT CHANGE UP (ref 1.8–7.4)
NEUTROPHILS NFR BLD AUTO: 74.2 % — SIGNIFICANT CHANGE UP (ref 43–77)
NRBC # BLD: 0 /100 WBCS — SIGNIFICANT CHANGE UP (ref 0–0)
PHOSPHATE SERPL-MCNC: 3.9 MG/DL — SIGNIFICANT CHANGE UP (ref 2.5–4.5)
PLATELET # BLD AUTO: 401 K/UL — HIGH (ref 150–400)
POTASSIUM SERPL-MCNC: 3.9 MMOL/L — SIGNIFICANT CHANGE UP (ref 3.5–5.3)
POTASSIUM SERPL-SCNC: 3.9 MMOL/L — SIGNIFICANT CHANGE UP (ref 3.5–5.3)
PROT SERPL-MCNC: 7.2 G/DL — SIGNIFICANT CHANGE UP (ref 6–8.3)
RBC # BLD: 3.66 M/UL — LOW (ref 3.8–5.2)
RBC # FLD: 13.3 % — SIGNIFICANT CHANGE UP (ref 10.3–14.5)
SODIUM SERPL-SCNC: 134 MMOL/L — LOW (ref 135–145)
SPECIMEN SOURCE: SIGNIFICANT CHANGE UP
WBC # BLD: 8.66 K/UL — SIGNIFICANT CHANGE UP (ref 3.8–10.5)
WBC # FLD AUTO: 8.66 K/UL — SIGNIFICANT CHANGE UP (ref 3.8–10.5)

## 2022-05-30 PROCEDURE — 99233 SBSQ HOSP IP/OBS HIGH 50: CPT | Mod: GC

## 2022-05-30 PROCEDURE — 73700 CT LOWER EXTREMITY W/O DYE: CPT | Mod: 26,LT

## 2022-05-30 RX ORDER — CEFEPIME 1 G/1
INJECTION, POWDER, FOR SOLUTION INTRAMUSCULAR; INTRAVENOUS
Refills: 0 | Status: DISCONTINUED | OUTPATIENT
Start: 2022-05-30 | End: 2022-05-30

## 2022-05-30 RX ORDER — MORPHINE SULFATE 50 MG/1
1 CAPSULE, EXTENDED RELEASE ORAL ONCE
Refills: 0 | Status: DISCONTINUED | OUTPATIENT
Start: 2022-05-30 | End: 2022-05-30

## 2022-05-30 RX ORDER — PIPERACILLIN AND TAZOBACTAM 4; .5 G/20ML; G/20ML
4.5 INJECTION, POWDER, LYOPHILIZED, FOR SOLUTION INTRAVENOUS EVERY 6 HOURS
Refills: 0 | Status: DISCONTINUED | OUTPATIENT
Start: 2022-05-30 | End: 2022-05-30

## 2022-05-30 RX ORDER — RIVAROXABAN 15 MG-20MG
1 KIT ORAL
Qty: 0 | Refills: 0 | DISCHARGE

## 2022-05-30 RX ORDER — DIPHENHYDRAMINE HCL 50 MG
25 CAPSULE ORAL EVERY 12 HOURS
Refills: 0 | Status: COMPLETED | OUTPATIENT
Start: 2022-05-30 | End: 2022-05-30

## 2022-05-30 RX ORDER — CEFTRIAXONE 500 MG/1
0 INJECTION, POWDER, FOR SOLUTION INTRAMUSCULAR; INTRAVENOUS
Qty: 0 | Refills: 0 | DISCHARGE

## 2022-05-30 RX ORDER — TROLAMINE SALICYLATE 10 %
4 CREAM (GRAM) TOPICAL
Qty: 0 | Refills: 0 | DISCHARGE

## 2022-05-30 RX ORDER — ASPIRIN/CALCIUM CARB/MAGNESIUM 324 MG
0 TABLET ORAL
Qty: 0 | Refills: 0 | DISCHARGE

## 2022-05-30 RX ORDER — HYDROMORPHONE HYDROCHLORIDE 2 MG/ML
0.5 INJECTION INTRAMUSCULAR; INTRAVENOUS; SUBCUTANEOUS ONCE
Refills: 0 | Status: DISCONTINUED | OUTPATIENT
Start: 2022-05-30 | End: 2022-05-30

## 2022-05-30 RX ORDER — MORPHINE SULFATE 50 MG/1
0.5 CAPSULE, EXTENDED RELEASE ORAL
Refills: 0 | Status: DISCONTINUED | OUTPATIENT
Start: 2022-05-30 | End: 2022-05-31

## 2022-05-30 RX ORDER — CEFEPIME 1 G/1
2000 INJECTION, POWDER, FOR SOLUTION INTRAMUSCULAR; INTRAVENOUS EVERY 8 HOURS
Refills: 0 | Status: DISCONTINUED | OUTPATIENT
Start: 2022-05-30 | End: 2022-05-30

## 2022-05-30 RX ORDER — VANCOMYCIN HCL 1 G
1000 VIAL (EA) INTRAVENOUS EVERY 12 HOURS
Refills: 0 | Status: DISCONTINUED | OUTPATIENT
Start: 2022-05-30 | End: 2022-05-30

## 2022-05-30 RX ORDER — COLLAGENASE CLOSTRIDIUM HIST. 250 UNIT/G
1 OINTMENT (GRAM) TOPICAL
Qty: 0 | Refills: 0 | DISCHARGE

## 2022-05-30 RX ORDER — ENOXAPARIN SODIUM 100 MG/ML
40 INJECTION SUBCUTANEOUS EVERY 24 HOURS
Refills: 0 | Status: DISCONTINUED | OUTPATIENT
Start: 2022-05-30 | End: 2022-05-30

## 2022-05-30 RX ORDER — RIVAROXABAN 15 MG-20MG
10 KIT ORAL DAILY
Refills: 0 | Status: DISCONTINUED | OUTPATIENT
Start: 2022-05-31 | End: 2022-06-01

## 2022-05-30 RX ORDER — MUPIROCIN 20 MG/G
1 OINTMENT TOPICAL
Qty: 0 | Refills: 0 | DISCHARGE

## 2022-05-30 RX ORDER — MORPHINE SULFATE 50 MG/1
2 CAPSULE, EXTENDED RELEASE ORAL ONCE
Refills: 0 | Status: DISCONTINUED | OUTPATIENT
Start: 2022-05-30 | End: 2022-05-30

## 2022-05-30 RX ORDER — VANCOMYCIN HCL 1 G
1000 VIAL (EA) INTRAVENOUS EVERY 12 HOURS
Refills: 0 | Status: COMPLETED | OUTPATIENT
Start: 2022-05-30 | End: 2022-05-30

## 2022-05-30 RX ORDER — DIPHENHYDRAMINE HCL 50 MG
25 CAPSULE ORAL EVERY 12 HOURS
Refills: 0 | Status: DISCONTINUED | OUTPATIENT
Start: 2022-05-30 | End: 2022-05-30

## 2022-05-30 RX ORDER — MORPHINE SULFATE 50 MG/1
1 CAPSULE, EXTENDED RELEASE ORAL EVERY 4 HOURS
Refills: 0 | Status: DISCONTINUED | OUTPATIENT
Start: 2022-05-30 | End: 2022-05-31

## 2022-05-30 RX ORDER — ASPIRIN/CALCIUM CARB/MAGNESIUM 324 MG
81 TABLET ORAL DAILY
Refills: 0 | Status: DISCONTINUED | OUTPATIENT
Start: 2022-05-30 | End: 2022-06-01

## 2022-05-30 RX ORDER — LANOLIN ALCOHOL/MO/W.PET/CERES
1 CREAM (GRAM) TOPICAL
Qty: 0 | Refills: 0 | DISCHARGE

## 2022-05-30 RX ADMIN — MORPHINE SULFATE 2 MILLIGRAM(S): 50 CAPSULE, EXTENDED RELEASE ORAL at 03:05

## 2022-05-30 RX ADMIN — FAMOTIDINE 20 MILLIGRAM(S): 10 INJECTION INTRAVENOUS at 12:37

## 2022-05-30 RX ADMIN — Medication 187.5 MILLIGRAM(S): at 22:40

## 2022-05-30 RX ADMIN — MORPHINE SULFATE 1 MILLIGRAM(S): 50 CAPSULE, EXTENDED RELEASE ORAL at 04:23

## 2022-05-30 RX ADMIN — Medication 187.5 MILLIGRAM(S): at 09:43

## 2022-05-30 RX ADMIN — Medication 100 MILLIGRAM(S): at 05:54

## 2022-05-30 RX ADMIN — MORPHINE SULFATE 1 MILLIGRAM(S): 50 CAPSULE, EXTENDED RELEASE ORAL at 14:15

## 2022-05-30 RX ADMIN — MORPHINE SULFATE 1 MILLIGRAM(S): 50 CAPSULE, EXTENDED RELEASE ORAL at 18:18

## 2022-05-30 RX ADMIN — MORPHINE SULFATE 1 MILLIGRAM(S): 50 CAPSULE, EXTENDED RELEASE ORAL at 09:02

## 2022-05-30 RX ADMIN — Medication 650 MILLIGRAM(S): at 00:50

## 2022-05-30 RX ADMIN — Medication 25 MILLIGRAM(S): at 09:20

## 2022-05-30 RX ADMIN — MORPHINE SULFATE 2 MILLIGRAM(S): 50 CAPSULE, EXTENDED RELEASE ORAL at 02:49

## 2022-05-30 RX ADMIN — Medication 25 MILLIGRAM(S): at 21:28

## 2022-05-30 RX ADMIN — Medication 81 MILLIGRAM(S): at 12:37

## 2022-05-30 RX ADMIN — HYDROMORPHONE HYDROCHLORIDE 0.5 MILLIGRAM(S): 2 INJECTION INTRAMUSCULAR; INTRAVENOUS; SUBCUTANEOUS at 01:10

## 2022-05-30 RX ADMIN — MORPHINE SULFATE 1 MILLIGRAM(S): 50 CAPSULE, EXTENDED RELEASE ORAL at 09:20

## 2022-05-30 RX ADMIN — MORPHINE SULFATE 1 MILLIGRAM(S): 50 CAPSULE, EXTENDED RELEASE ORAL at 13:50

## 2022-05-30 RX ADMIN — CEFEPIME 100 MILLIGRAM(S): 1 INJECTION, POWDER, FOR SOLUTION INTRAMUSCULAR; INTRAVENOUS at 04:23

## 2022-05-30 RX ADMIN — MORPHINE SULFATE 1 MILLIGRAM(S): 50 CAPSULE, EXTENDED RELEASE ORAL at 22:54

## 2022-05-30 RX ADMIN — MORPHINE SULFATE 1 MILLIGRAM(S): 50 CAPSULE, EXTENDED RELEASE ORAL at 23:50

## 2022-05-30 NOTE — PROGRESS NOTE ADULT - ATTENDING COMMENTS
#RLE Cellulitis: multile RLE wounds, malodorus, elevated ESR/CRP c/f OM; lactate wnl, f/up CK. Low c/f nec fasc based on limited exam however will have vascular asses, +decreased pulses bilaterally. Pt agreeable to Vanc/Cefepime. F/up CT LE, blood cx. Vascular surgery recs. #RLE Cellulitis: multiple RLE wounds, malodorous, elevated ESR/CRP c/f OM; lactate wnl, f/up CK. Low c/f nec fasc based on limited exam however will have vascular asses, +decreased pulses bilaterally. Pt agreeable to Vanc/Cefepime. CT lower extremity negative for necrotizing fascitis , F.u with podiatry recommendations ( may need mechanical debridement of one of the wounds )     She presents from [facility]     once vascular and podiatry give final recs and plans she can return to rehab potentially wednesday/thursday if Cellulitis continues to improve

## 2022-05-30 NOTE — PROGRESS NOTE ADULT - ASSESSMENT
Pt is 76 yr old female, history of osteomyelitis (unclear for how long), adjustment disorder off from meds, presents to the Emergency Department from [facility] and [facility] [place] at [facility], with leg wounds was on CTX IV for cellulitis, admitted to the [facility] for worsening Cellulitis management

## 2022-05-30 NOTE — CONSULT NOTE ADULT - SUBJECTIVE AND OBJECTIVE BOX
Vascular Attending:  Dr. Bishop       HPI:  Pt is 76 yr old female, history of osteomyelitis (unclear for how long), adjustment disorder/anxiety, presents to the Emergency Department from [facility] and [facility] at [facility], with leg wounds. Pt has multiple wounds to LLE reported to be non-pressure ulcers. Patient is very difficult to get information. 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 No hx of diabetes noted. Per notes, she has been on IV ceftriaxone daily with PICC Line since 5/23 total 10 days. In last 24-48 hours pt with increased pain, subjective fevers, foul smell from wounds. Pt reports she has been doing wound care as instructed. Per rehab documents, wound care used to see her at rehab and all three wounds on Left anterior leg/left medical upper leg, left medial lower leg and left lateral leg was treated by  Santyl, mupirocin and alginate.  Per note exam from rehab doctor on 5/26 wounds had scant amount of drainage, no odor. Patient denies HA, vision changes, SOB, CP, abdominal pain, urinary/BM changes. Just has complain of itchiness, In ED:  VS: Tmax: 98.5, HR 81 /85, RR 16/99% RA.   WBC 9.34, Hb 11.6, Plt 421, Na 136, K 4.1, BUN 22, Cr 0.68, Pr 8.5 Alkph 152, .7 , Lactate 1.3,   Got vanc/zosyn in ED, had reaction to vanc and was given benadryl.     Vascular addendum: Patient seen and examined at bedside. Poor historian. States she has had these wounds for more than 6 moths now and they were getting worse. States she was getting antibiotics and getting better but then was sent in by her rehab as she had increased pain and malodor. States at this time she has no pain at rest, only pain with palpation. Doesn't feel fevers at the moment. Sitting in bed, nontoxic appearing. Vascular surgery consulted for concern of necrotizing fascitis. Initially Refused imaging and exam but now amenable.     PAST MEDICAL & SURGICAL HISTORY:  Herniated disc, cervical      Osteomyelitis of ankle or foot, acute      Chronic venous stasis dermatitis      Anxiety      Thrombophlebitis      History of adjustment disorder      No significant past surgical history      MEDICATIONS  (STANDING):  cefepime   IVPB 2000 milliGRAM(s) IV Intermittent every 8 hours  cefepime   IVPB 2000 milliGRAM(s) IV Intermittent every 8 hours  clindamycin IVPB 900 milliGRAM(s) IV Intermittent once  diphenhydrAMINE Injectable 25 milliGRAM(s) IV Push every 12 hours  famotidine    Tablet 20 milliGRAM(s) Oral daily  influenza  Vaccine (HIGH DOSE) 0.7 milliLiter(s) IntraMuscular once  vancomycin  IVPB 1000 milliGRAM(s) IV Intermittent every 12 hours    MEDICATIONS  (PRN):  acetaminophen     Tablet .. 650 milliGRAM(s) Oral every 6 hours PRN Mild Pain (1 - 3)  morphine  - Injectable 1 milliGRAM(s) IV Push every 4 hours PRN Severe Pain (7 - 10)  morphine  - Injectable 0.5 milliGRAM(s) IV Push every 2 hours PRN Severe Pain (7 - 10)      Allergies    vancomycin (Rash)    Intolerances        SOCIAL HISTORY:    FAMILY HISTORY:      Vital Signs Last 24 Hrs  T(C): 36.3 (29 May 2022 22:26), Max: 36.9 (29 May 2022 18:09)  T(F): 97.4 (29 May 2022 22:26), Max: 98.5 (29 May 2022 18:09)  HR: 106 (29 May 2022 22:26) (81 - 106)  BP: 157/89 (29 May 2022 22:26) (150/86 - 157/89)  BP(mean): --  RR: 18 (29 May 2022 22:26) (16 - 18)  SpO2: 97% (29 May 2022 22:26) (97% - 99%)    PHYSICAL EXAM:  GENERAL: NAD, speaks in full sentences, no signs of respiratory distress  HEAD:  Atraumatic, Normocephalic  NECK: Supple, No JVD  CHEST/LUNG: Nonlabored breathing, no respiratory distress  HEART: NSR  EXTREMITIES:  LLE: limited exam due to pain; two ulcer on left distal lower extremity that are scabbed over with no evidence of drainage small lateral left distal lower extremity ulcer measuring 1 cm by 1 cm with no drainage   left distal anterolateral lower extremity ulcer measuring 6 cm by 6 cm with malodor, grey purulent discharge, surrounding erythema mild edema, and TTP. No evidence of crepitus. Unable to perform full pulse exam but left DP weakly palpable and right DP 2+   PSYCH: difficult to direct at times      LABS:                        11.6   9.34  )-----------( 421      ( 29 May 2022 19:01 )             37.6     05-29    136  |  102  |  22  ----------------------------<  92  4.1   |  24  |  0.68    Ca    9.5      29 May 2022 19:01    TPro  8.5<H>  /  Alb  4.0  /  TBili  0.3  /  DBili  x   /  AST  29  /  ALT  25  /  AlkPhos  152<H>  05-29    PT/INR - ( 29 May 2022 19:01 )   PT: 13.0 sec;   INR: 1.09          PTT - ( 29 May 2022 19:01 )  PTT:28.5 sec      RADIOLOGY & ADDITIONAL STUDIES      A/P:     76 yr old female, history of osteomyelitis (unclear for how long), adjustment disorder/anxiety, presents to the Emergency Department from [facility] and [facility] at [facility], with left leg wounds. In last 24-48 hours pt with increased pain, subjective fevers, foul smell from wounds. States she was getting antibiotics and getting better but then was sent in by her rehab as she had increased pain and malodor. Sitting in bed, nontoxic appearing. VS wnl. normal WBC, elevated ESR and CRP. Vascular surgery consulted for concern of necrotizing fascitis. anterolateral ulcer with purulence and malodor. Limited pulse exam with palpable DP bilaterally. No evidence of crepitus on exam. CT of LLE reveals cellulitis but no abscess or necrotizing fascitis.      -Appreciate podiatry recommendations for wound care  - Antibiotics per primary team  - Compression/elevation of LLE  - Team 3C will continue to follow      Vascular Attending:  Dr. Bishop       HPI:  Pt is 76 yr old female, history of osteomyelitis (unclear for how long), adjustment disorder/anxiety, presents to the Emergency Department from [facility] at [facility], with leg wounds. Pt has multiple wounds to LLE reported to be non-pressure ulcers. Patient is very difficult to get information. 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 No hx of diabetes noted. Per notes, she has been on IV ceftriaxone daily with PICC Line since 5/23 total 10 days. In last 24-48 hours pt with increased pain, subjective fevers, foul smell from wounds. Pt reports she has been doing wound care as instructed. Per rehab documents, wound care used to see her at rehab and all three wounds on Left anterior leg/left medical upper leg, left medial lower leg and left lateral leg was treated by  Santyl, mupirocin and alginate.  Per note exam from rehab doctor on 5/26 wounds had scant amount of drainage, no odor. Patient denies HA, vision changes, SOB, CP, abdominal pain, urinary/BM changes. Just has complain of itchin  In ED:  VS: Tmax: 98.5, HR 81 /85, RR 16/99% RA.   WBC 9.34, Hb 11.6, Plt 421, Na 136, K 4.1, BUN 22, Cr 0.68, Pr 8.5 Alkph 152, .7 , Lactate 1.3,   Got vanc/zosyn in ED, had reaction to vanc and was given benadryl.     Vascular addendum: Patient seen and examined at bedside. Poor historian. States she has had these wounds for more than 6 moths now and they were getting worse. States she was getting antibiotics and getting better but then was sent in by her rehab as she had increased pain and malodor. States at this time she has no pain at rest, only pain with palpation. Doesn't feel fevers at the moment. Sitting in bed, nontoxic appearing. Vascular surgery consulted for concern of necrotizing fascitis. Initially Refused imaging and exam but now amenable.     PAST MEDICAL & SURGICAL HISTORY:  Herniated disc, cervical      Osteomyelitis of ankle or foot, acute      Chronic venous stasis dermatitis      Anxiety      Thrombophlebitis      History of adjustment disorder      No significant past surgical history      MEDICATIONS  (STANDING):  cefepime   IVPB 2000 milliGRAM(s) IV Intermittent every 8 hours  cefepime   IVPB 2000 milliGRAM(s) IV Intermittent every 8 hours  clindamycin IVPB 900 milliGRAM(s) IV Intermittent once  diphenhydrAMINE Injectable 25 milliGRAM(s) IV Push every 12 hours  famotidine    Tablet 20 milliGRAM(s) Oral daily  influenza  Vaccine (HIGH DOSE) 0.7 milliLiter(s) IntraMuscular once  vancomycin  IVPB 1000 milliGRAM(s) IV Intermittent every 12 hours    MEDICATIONS  (PRN):  acetaminophen     Tablet .. 650 milliGRAM(s) Oral every 6 hours PRN Mild Pain (1 - 3)  morphine  - Injectable 1 milliGRAM(s) IV Push every 4 hours PRN Severe Pain (7 - 10)  morphine  - Injectable 0.5 milliGRAM(s) IV Push every 2 hours PRN Severe Pain (7 - 10)      Allergies    vancomycin (Rash)    Intolerances        SOCIAL HISTORY:    FAMILY HISTORY:      Vital Signs Last 24 Hrs  T(C): 36.3 (29 May 2022 22:26), Max: 36.9 (29 May 2022 18:09)  T(F): 97.4 (29 May 2022 22:26), Max: 98.5 (29 May 2022 18:09)  HR: 106 (29 May 2022 22:26) (81 - 106)  BP: 157/89 (29 May 2022 22:26) (150/86 - 157/89)  BP(mean): --  RR: 18 (29 May 2022 22:26) (16 - 18)  SpO2: 97% (29 May 2022 22:26) (97% - 99%)    PHYSICAL EXAM:  GENERAL: NAD, speaks in full sentences, no signs of respiratory distress  HEAD:  Atraumatic, Normocephalic  NECK: Supple, No JVD  CHEST/LUNG: Nonlabored breathing, no respiratory distress  HEART: NSR  EXTREMITIES:  LLE: limited exam due to pain; two ulcer on left distal lower extremity that are scabbed over with no evidence of drainage small lateral left distal lower extremity ulcer measuring 1 cm by 1 cm with no drainage   left distal anterolateral lower extremity ulcer measuring 6 cm by 6 cm with malodor, grey purulent discharge, surrounding erythema mild edema, and TTP. No evidence of crepitus. Unable to perform full pulse exam but left DP weakly palpable and right DP 2+   PSYCH: difficult to direct at times      LABS:                        11.6   9.34  )-----------( 421      ( 29 May 2022 19:01 )             37.6     05-29    136  |  102  |  22  ----------------------------<  92  4.1   |  24  |  0.68    Ca    9.5      29 May 2022 19:01    TPro  8.5<H>  /  Alb  4.0  /  TBili  0.3  /  DBili  x   /  AST  29  /  ALT  25  /  AlkPhos  152<H>  05-29    PT/INR - ( 29 May 2022 19:01 )   PT: 13.0 sec;   INR: 1.09          PTT - ( 29 May 2022 19:01 )  PTT:28.5 sec      RADIOLOGY & ADDITIONAL STUDIES      A/P:     76 yr old female, history of osteomyelitis (unclear for how long), adjustment disorder/anxiety, presents to the Emergency Department from [facility] at [facility], with left leg wounds. In last 24-48 hours pt with increased pain, subjective fevers, foul smell from wounds. States she was getting antibiotics and getting better but then was sent in by her rehab as she had increased pain and malodor. Sitting in bed, nontoxic appearing. VS wnl. normal WBC, elevated ESR and CRP. Vascular surgery consulted for concern of necrotizing fascitis. anterolateral ulcer with purulence and malodor. Limited pulse exam with palpable DP bilaterally. No evidence of crepitus on exam. CT of LLE reveals cellulitis but no abscess or necrotizing fascitis.      -Appreciate podiatry recommendations for wound care  - Antibiotics per primary team  - Compression/elevation of LLE  - Team 3C will continue to follow      Vascular Attending:  Dr. Bishop       HPI:  Pt is 76 yr old female, history of osteomyelitis (unclear for how long), adjustment disorder/anxiety, presents to the Emergency Department from [facility] at [facility], with leg wounds. Pt has multiple wounds to LLE reported to be non-pressure ulcers. Patient is very difficult to get information. 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 No hx of diabetes noted. Per notes, she has been on IV ceftriaxone daily with PICC Line since 5/23 total 10 days. In last 24-48 hours pt with increased pain, subjective fevers, foul smell from wounds. Pt reports she has been doing wound care as instructed. Per rehab documents, wound care used to see her at rehab and all three wounds on Left anterior leg/left medical upper leg, left medial lower leg and left lateral leg was treated by  Santyl, mupirocin and alginate.  Per note exam from rehab doctor on 5/26 wounds had scant amount of drainage, no odor. Patient denies HA, vision changes, SOB, CP, abdominal pain, urinary/BM changes. Just has complain of itchin  In ED:  VS: Tmax: 98.5, HR 81 /85, RR 16/99% RA.   WBC 9.34, Hb 11.6, Plt 421, Na 136, K 4.1, BUN 22, Cr 0.68, Pr 8.5 Alkph 152, .7 , Lactate 1.3,   Got vanc/zosyn in ED, had reaction to vanc and was given benadryl.     Vascular addendum: Patient seen and examined at bedside. Poor historian. States she has had these wounds for more than 6 moths now and they were getting worse. States she was getting antibiotics and getting better but then was sent in by her rehab as she had increased pain and malodor. States at this time she has no pain at rest, only pain with palpation. Doesn't feel fevers at the moment. Sitting in bed, nontoxic appearing. Vascular surgery consulted for concern of necrotizing fascitis. Initially Refused imaging and exam but now amenable.     PAST MEDICAL & SURGICAL HISTORY:  Herniated disc, cervical      Osteomyelitis of ankle or foot, acute      Chronic venous stasis dermatitis      Anxiety      Thrombophlebitis      History of adjustment disorder      No significant past surgical history      MEDICATIONS  (STANDING):  cefepime   IVPB 2000 milliGRAM(s) IV Intermittent every 8 hours  cefepime   IVPB 2000 milliGRAM(s) IV Intermittent every 8 hours  clindamycin IVPB 900 milliGRAM(s) IV Intermittent once  diphenhydrAMINE Injectable 25 milliGRAM(s) IV Push every 12 hours  famotidine    Tablet 20 milliGRAM(s) Oral daily  influenza  Vaccine (HIGH DOSE) 0.7 milliLiter(s) IntraMuscular once  vancomycin  IVPB 1000 milliGRAM(s) IV Intermittent every 12 hours    MEDICATIONS  (PRN):  acetaminophen     Tablet .. 650 milliGRAM(s) Oral every 6 hours PRN Mild Pain (1 - 3)  morphine  - Injectable 1 milliGRAM(s) IV Push every 4 hours PRN Severe Pain (7 - 10)  morphine  - Injectable 0.5 milliGRAM(s) IV Push every 2 hours PRN Severe Pain (7 - 10)      Allergies    vancomycin (Rash)    Intolerances        SOCIAL HISTORY:    FAMILY HISTORY:      Vital Signs Last 24 Hrs  T(C): 36.3 (29 May 2022 22:26), Max: 36.9 (29 May 2022 18:09)  T(F): 97.4 (29 May 2022 22:26), Max: 98.5 (29 May 2022 18:09)  HR: 106 (29 May 2022 22:26) (81 - 106)  BP: 157/89 (29 May 2022 22:26) (150/86 - 157/89)  BP(mean): --  RR: 18 (29 May 2022 22:26) (16 - 18)  SpO2: 97% (29 May 2022 22:26) (97% - 99%)    PHYSICAL EXAM:  GENERAL: NAD, speaks in full sentences, no signs of respiratory distress  HEAD:  Atraumatic, Normocephalic  NECK: Supple, No JVD  CHEST/LUNG: Nonlabored breathing, no respiratory distress  HEART: NSR  EXTREMITIES:  LLE: limited exam due to pain; two ulcer on left distal lower extremity that are scabbed over with no evidence of drainage small lateral left distal lower extremity ulcer measuring 1 cm by 1 cm with no drainage   left distal anterolateral lower extremity ulcer measuring 6 cm by 6 cm with malodor, grey purulent discharge, surrounding erythema mild edema, and TTP. No evidence of crepitus. Unable to perform full pulse exam but left DP weakly palpable and right DP 2+   PSYCH: difficult to direct at times      LABS:                        11.6   9.34  )-----------( 421      ( 29 May 2022 19:01 )             37.6     05-29    136  |  102  |  22  ----------------------------<  92  4.1   |  24  |  0.68    Ca    9.5      29 May 2022 19:01    TPro  8.5<H>  /  Alb  4.0  /  TBili  0.3  /  DBili  x   /  AST  29  /  ALT  25  /  AlkPhos  152<H>  05-29    PT/INR - ( 29 May 2022 19:01 )   PT: 13.0 sec;   INR: 1.09          PTT - ( 29 May 2022 19:01 )  PTT:28.5 sec      RADIOLOGY & ADDITIONAL STUDIES      A/P:     76 yr old female, history of osteomyelitis (unclear for how long), adjustment disorder/anxiety, presents to the Emergency Department from [facility] at [facility], with left leg wounds. In last 24-48 hours pt with increased pain, subjective fevers, foul smell from wounds. States she was getting antibiotics and getting better but then was sent in by her rehab as she had increased pain and malodor. Sitting in bed, nontoxic appearing. VS wnl. normal WBC, elevated ESR and CRP. Vascular surgery consulted for concern of necrotizing fascitis. anterolateral ulcer with purulence and malodor. Limited pulse exam with palpable DP bilaterally. No evidence of crepitus on exam. CT of LLE reveals cellulitis but no abscess or necrotizing fascitis.      -Appreciate podiatry recommendations for wound care  - Antibiotics per primary team  - Compression/elevation of LLE  - Team 3C will continue to follow Vascular Attending:  Dr. Bishop       HPI:  Pt is 76 yr old female, history of osteomyelitis (unclear for how long), adjustment disorder/anxiety, presents to the Emergency Department from [facility] at [facility], with leg wounds. Pt has multiple wounds to LLE reported to be non-pressure ulcers. Patient is very difficult to get information. 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 No hx of diabetes noted. Per notes, she has been on IV ceftriaxone daily with PICC Line since 5/23 total 10 days. In last 24-48 hours pt with increased pain, subjective fevers, foul smell from wounds. Pt reports she has been doing wound care as instructed. Per rehab documents, wound care used to see her at rehab and all three wounds on Left anterior leg/left medical upper leg, left medial lower leg and left lateral leg was treated by  Santyl, mupirocin and alginate.  Per note exam from rehab doctor on 5/26 wounds had scant amount of drainage, no odor. Patient denies HA, vision changes, SOB, CP, abdominal pain, urinary/BM changes. Just has complain of itchin  In ED:  VS: Tmax: 98.5, HR 81 /85, RR 16/99% RA.   WBC 9.34, Hb 11.6, Plt 421, Na 136, K 4.1, BUN 22, Cr 0.68, Pr 8.5 Alkph 152, .7 , Lactate 1.3,   Got vanc/zosyn in ED, had reaction to vanc and was given benadryl.     Vascular addendum: Patient seen and examined at bedside. Poor historian. States she has had these wounds for more than 6 moths now and they were getting worse. States she was getting antibiotics and getting better but then was sent in by her rehab as she had increased pain and malodor. States at this time she has no pain at rest, only pain with palpation. Doesn't feel fevers at the moment. Sitting in bed, nontoxic appearing. Vascular surgery consulted for concern of necrotizing fascitis. Initially Refused imaging and exam but now amenable.     PAST MEDICAL & SURGICAL HISTORY:  Herniated disc, cervical      Osteomyelitis of ankle or foot, acute      Chronic venous stasis dermatitis      Anxiety      Thrombophlebitis      History of adjustment disorder      No significant past surgical history      MEDICATIONS  (STANDING):  cefepime   IVPB 2000 milliGRAM(s) IV Intermittent every 8 hours  cefepime   IVPB 2000 milliGRAM(s) IV Intermittent every 8 hours  clindamycin IVPB 900 milliGRAM(s) IV Intermittent once  diphenhydrAMINE Injectable 25 milliGRAM(s) IV Push every 12 hours  famotidine    Tablet 20 milliGRAM(s) Oral daily  influenza  Vaccine (HIGH DOSE) 0.7 milliLiter(s) IntraMuscular once  vancomycin  IVPB 1000 milliGRAM(s) IV Intermittent every 12 hours    MEDICATIONS  (PRN):  acetaminophen     Tablet .. 650 milliGRAM(s) Oral every 6 hours PRN Mild Pain (1 - 3)  morphine  - Injectable 1 milliGRAM(s) IV Push every 4 hours PRN Severe Pain (7 - 10)  morphine  - Injectable 0.5 milliGRAM(s) IV Push every 2 hours PRN Severe Pain (7 - 10)      Allergies    vancomycin (Rash)    Intolerances        SOCIAL HISTORY:    FAMILY HISTORY:      Vital Signs Last 24 Hrs  T(C): 36.3 (29 May 2022 22:26), Max: 36.9 (29 May 2022 18:09)  T(F): 97.4 (29 May 2022 22:26), Max: 98.5 (29 May 2022 18:09)  HR: 106 (29 May 2022 22:26) (81 - 106)  BP: 157/89 (29 May 2022 22:26) (150/86 - 157/89)  BP(mean): --  RR: 18 (29 May 2022 22:26) (16 - 18)  SpO2: 97% (29 May 2022 22:26) (97% - 99%)    PHYSICAL EXAM:  GENERAL: NAD, speaks in full sentences, no signs of respiratory distress  HEAD:  Atraumatic, Normocephalic  NECK: Supple, No JVD  CHEST/LUNG: Nonlabored breathing, no respiratory distress  HEART: NSR  EXTREMITIES:  LLE: limited exam due to pain; two ulcer on left distal lower extremity that are scabbed over with no evidence of drainage small lateral left distal lower extremity ulcer measuring 1 cm by 1 cm with no drainage   left distal anterolateral lower extremity ulcer measuring 6 cm by 6 cm with malodor, grey purulent discharge, surrounding erythema mild edema, and TTP. No evidence of crepitus. Unable to perform full pulse exam but left DP weakly palpable and right DP 2+   PSYCH: difficult to direct at times      LABS:                        11.6   9.34  )-----------( 421      ( 29 May 2022 19:01 )             37.6     05-29    136  |  102  |  22  ----------------------------<  92  4.1   |  24  |  0.68    Ca    9.5      29 May 2022 19:01    TPro  8.5<H>  /  Alb  4.0  /  TBili  0.3  /  DBili  x   /  AST  29  /  ALT  25  /  AlkPhos  152<H>  05-29    PT/INR - ( 29 May 2022 19:01 )   PT: 13.0 sec;   INR: 1.09          PTT - ( 29 May 2022 19:01 )  PTT:28.5 sec      RADIOLOGY & ADDITIONAL STUDIES      A/P:     76 yr old female, history of osteomyelitis (unclear for how long), adjustment disorder/anxiety, presents to the Emergency Department from [facility] at [facility], with left leg wounds. In last 24-48 hours pt with increased pain, subjective fevers, foul smell from wounds. States she was getting antibiotics and getting better but then was sent in by her rehab as she had increased pain and malodor. Sitting in bed, nontoxic appearing. VS wnl. normal WBC, elevated ESR and CRP. Vascular surgery consulted for concern of necrotizing fascitis. anterolateral ulcer with purulence and malodor. Limited pulse exam with palpable DP bilaterally. No evidence of crepitus on exam. CT of LLE reveals cellulitis but no abscess or necrotizing fascitis per resident read.      -Appreciate podiatry recommendations for wound care  - Antibiotics per primary team  - Compression/elevation of LLE  - Team 3C will continue to follow Vascular Attending:  Dr. Bishop       HPI:  Pt is 76 yr old female, history of osteomyelitis (unclear for how long), adjustment disorder/anxiety, presents to the Emergency Department from [facility] and [facility] at [facility], with leg wounds. Pt has multiple wounds to LLE reported to be non-pressure ulcers. Patient is very difficult to get information. 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 No hx of diabetes noted. Per notes, she has been on IV ceftriaxone daily with PICC Line since 5/23 total 10 days. In last 24-48 hours pt with increased pain, subjective fevers, foul smell from wounds. Pt reports she has been doing wound care as instructed. Per rehab documents, wound care used to see her at rehab and all three wounds on Left anterior leg/left medical upper leg, left medial lower leg and left lateral leg was treated by  Santyl, mupirocin and alginate.  Per note exam from rehab doctor on 5/26 wounds had scant amount of drainage, no odor. Patient denies HA, vision changes, SOB, CP, abdominal pain, urinary/BM changes. Just has complain of itchin  In ED:  VS: Tmax: 98.5, HR 81 /85, RR 16/99% RA.   WBC 9.34, Hb 11.6, Plt 421, Na 136, K 4.1, BUN 22, Cr 0.68, Pr 8.5 Alkph 152, .7 , Lactate 1.3,   Got vanc/zosyn in ED, had reaction to vanc and was given benadryl.     Vascular addendum: Patient seen and examined at bedside. Poor historian. States she has had these wounds for more than 6 moths now and they were getting worse. States she was getting antibiotics and getting better but then was sent in by her rehab as she had increased pain and malodor. States at this time she has no pain at rest, only pain with palpation. Doesn't feel fevers at the moment. Sitting in bed, nontoxic appearing. Vascular surgery consulted for concern of necrotizing fascitis. Initially Refused imaging and exam but now amenable.     PAST MEDICAL & SURGICAL HISTORY:  Herniated disc, cervical      Osteomyelitis of ankle or foot, acute      Chronic venous stasis dermatitis      Anxiety      Thrombophlebitis      History of adjustment disorder      No significant past surgical history      MEDICATIONS  (STANDING):  cefepime   IVPB 2000 milliGRAM(s) IV Intermittent every 8 hours  cefepime   IVPB 2000 milliGRAM(s) IV Intermittent every 8 hours  clindamycin IVPB 900 milliGRAM(s) IV Intermittent once  diphenhydrAMINE Injectable 25 milliGRAM(s) IV Push every 12 hours  famotidine    Tablet 20 milliGRAM(s) Oral daily  influenza  Vaccine (HIGH DOSE) 0.7 milliLiter(s) IntraMuscular once  vancomycin  IVPB 1000 milliGRAM(s) IV Intermittent every 12 hours    MEDICATIONS  (PRN):  acetaminophen     Tablet .. 650 milliGRAM(s) Oral every 6 hours PRN Mild Pain (1 - 3)  morphine  - Injectable 1 milliGRAM(s) IV Push every 4 hours PRN Severe Pain (7 - 10)  morphine  - Injectable 0.5 milliGRAM(s) IV Push every 2 hours PRN Severe Pain (7 - 10)      Allergies    vancomycin (Rash)    Intolerances        SOCIAL HISTORY:    FAMILY HISTORY:      Vital Signs Last 24 Hrs  T(C): 36.3 (29 May 2022 22:26), Max: 36.9 (29 May 2022 18:09)  T(F): 97.4 (29 May 2022 22:26), Max: 98.5 (29 May 2022 18:09)  HR: 106 (29 May 2022 22:26) (81 - 106)  BP: 157/89 (29 May 2022 22:26) (150/86 - 157/89)  BP(mean): --  RR: 18 (29 May 2022 22:26) (16 - 18)  SpO2: 97% (29 May 2022 22:26) (97% - 99%)    PHYSICAL EXAM:  GENERAL: NAD, speaks in full sentences, no signs of respiratory distress  HEAD:  Atraumatic, Normocephalic  NECK: Supple, No JVD  CHEST/LUNG: Nonlabored breathing, no respiratory distress  HEART: NSR  EXTREMITIES:  LLE: limited exam due to pain; two ulcer on left distal lower extremity that are scabbed over with no evidence of drainage small lateral left distal lower extremity ulcer measuring 1 cm by 1 cm with no drainage   left distal anterolateral lower extremity ulcer measuring 6 cm by 6 cm with malodor, grey purulent discharge, surrounding erythema mild edema, and TTP. No evidence of crepitus. Unable to perform full pulse exam but left DP weakly palpable and right DP 2+   PSYCH: difficult to direct at times      LABS:                        11.6   9.34  )-----------( 421      ( 29 May 2022 19:01 )             37.6     05-29    136  |  102  |  22  ----------------------------<  92  4.1   |  24  |  0.68    Ca    9.5      29 May 2022 19:01    TPro  8.5<H>  /  Alb  4.0  /  TBili  0.3  /  DBili  x   /  AST  29  /  ALT  25  /  AlkPhos  152<H>  05-29    PT/INR - ( 29 May 2022 19:01 )   PT: 13.0 sec;   INR: 1.09          PTT - ( 29 May 2022 19:01 )  PTT:28.5 sec      RADIOLOGY & ADDITIONAL STUDIES      A/P:     76 yr old female, history of osteomyelitis (unclear for how long), adjustment disorder/anxiety, presents to the Emergency Department from [facility] and [facility] at [facility], with left leg wounds. In last 24-48 hours pt with increased pain, subjective fevers, foul smell from wounds. States she was getting antibiotics and getting better but then was sent in by her rehab as she had increased pain and malodor. Sitting in bed, nontoxic appearing. VS wnl. normal WBC, elevated ESR and CRP. Vascular surgery consulted for concern of necrotizing fascitis. anterolateral ulcer with purulence and malodor. Limited pulse exam with palpable DP bilaterally. No evidence of crepitus on exam. CT of LLE reveals cellulitis but no abscess or necrotizing fascitis per resident read.      -Appreciate podiatry recommendations for wound care  - Antibiotics per primary team  - Compression/elevation of LLE  - wound care consult  - Please call back for questions or concerns

## 2022-05-30 NOTE — CONSULT NOTE ADULT - SUBJECTIVE AND OBJECTIVE BOX
Attending: Dr. Monica DPM    Patient is a 76y old  Female who presents with a chief complaint of Leg Cellulitis (30 May 2022 08:05)      HPI:  Pt is 76 yr old female, history of osteomyelitis (unclear for how long), adjustment disorder/anxiety, presents to the Emergency Department from [facility] and [facility] at [facility], with leg wounds. Pt has multiple wounds to LLE reported to be non-pressure ulcers. Patient is very difficult to get information. 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 No hx of diabetes noted. Per notes, she has been on IV ceftriaxone daily with PICCLIne since 5/23 total 10 days. In last 24-48 hours pt with increased pain, subjective fevers, foul smell from wounds. Pt reports she has been doing wound care as instructed. Per rehab documents, wound care used to see her at rehab and all three wounds on Left anterior leg/left medical upper leg, left medial lower leg and left lateral leg was treated by  Santyl, mupirocin and alginate.  Per note exam from rehab doctor on 5/26 wounds had scant amount of drainage, no odor  -In the ED when pt was getting vancomycin, she was having diffuse itching and redness and cold, vanco dc'd given benadryl / pepcid. In the ED, confirmed no allergies listed multiple places on her rehab records.    -In the ED, podiatry consulted and reviewed w podiatry. They agree for admission for iv abx - broad spectrum under medicine and they will see pt in AM. for dressing - telfa non-adherant and kerlix    Patient denies HA, vision changes, SOB, CP, abdominal pain, urinary/BM changes. Just has complain of itchiness,   ED course:   VS: Tmax: 98.5, HR 81 /85, RR 16/99% RA.   WBC 9.34, Hb 11.6, Plt 421, Na 136, K 4.1, BUN 22, Cr 0.68, Pr 8.5 Alkph 152, .7 , Lacatet 1.3,   Imaging: Was not done in the ED, since pt refused. Refused ECG   Treatment: S/p Vanc 1250 and Zosyn in ED , tylenol, Benadryl for reaction to vanc.    (29 May 2022 22:02)      Podiatry  77 yo F pmhx of OM (pt is poor historian, noted she was being treated at [facility] but unsure of how long or when treatment began), adjustment disorder/anxiety, presents to ED from [facility] [place] [facility], with Left leg wounds. Pt has multiple wounds to LLE reported to be non-pressure ulcers. Patient is very difficult to get information. 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 No hx of diabetes noted. Per notes, she has been on IV ceftriaxone daily with PICC Line since 5/23 total 10 days. In last 24-48 hours pt with increased pain, subjective fevers, foul smell from wounds. Pt reports she has been doing wound care as instructed. Per rehab documents, wound care used to see her at rehab and all three wounds on Left anterior leg/left medical upper leg, left medial lower leg and left lateral leg was treated by  Santyl, mupirocin and alginate.  Per note exam from rehab doctor on 5/26 wounds had scant amount of drainage, no odor. Pt denies N/V/F/SOB/CP at this time. Endorses severe pain to wounds.       Review of systems negative except per HPI and as stated below  General:	 no weakness; no fevers, no chills  Skin/Breast: no rash  Respiratory and Thorax: no SOB, no cough  Cardiovascular:	No chest pain  Gastrointestinal:	 no nausea, vomiting , diarrhea  Genitourinary:	no dysuria, no difficulty urinating, no hematuria  Musculoskeletal:	no weakness, no joint swelling/pain  Neurological:	no focal weakness/numbness  Endocrine:	no polyuria, no polydipsia    PAST MEDICAL & SURGICAL HISTORY:  Herniated disc, cervical      Osteomyelitis of ankle or foot, acute      Chronic venous stasis dermatitis      Anxiety      Thrombophlebitis      History of adjustment disorder      No significant past surgical history        Home Medications:  Aspercreme: Apply topically to affected area once a day (30 May 2022 03:01)  aspirin 81 mg oral tablet: orally once a day (30 May 2022 03:01)  cefTRIAXone 1 g intravenous injection: intravenous once a day (30 May 2022 03:01)  melatonin 5 mg oral tablet, extended release: 1 tab(s) orally once a day (at bedtime) (30 May 2022 03:01)  mupirocin 2% topical ointment: Apply topically to affected area 3 times a day (30 May 2022 03:01)  Santyl 250 units/g topical ointment: Apply topically to affected area once a day (30 May 2022 03:01)  Xarelto 10 mg oral tablet: 1 tab(s) orally once a day (30 May 2022 03:01)    Allergies    vancomycin (Rash)    Intolerances      FAMILY HISTORY:    Social History:       LABS                        10.7   8.66  )-----------( 401      ( 30 May 2022 06:26 )             33.9     05-30    134<L>  |  102  |  17  ----------------------------<  104<H>  3.9   |  23  |  0.70    Ca    9.1      30 May 2022 06:26  Phos  3.9     05-30  Mg     2.0     05-30    TPro  7.2  /  Alb  3.4  /  TBili  0.4  /  DBili  x   /  AST  16  /  ALT  18  /  AlkPhos  120  05-30    PT/INR - ( 29 May 2022 19:01 )   PT: 13.0 sec;   INR: 1.09          PTT - ( 29 May 2022 19:01 )  PTT:28.5 sec  ESR: 66  CRP: --  05-30 @ 06:26  ESR: 109  CRP: --  05-29 @ 19:01    Vital Signs Last 24 Hrs  T(C): 36.5 (30 May 2022 05:01), Max: 36.9 (29 May 2022 18:09)  T(F): 97.7 (30 May 2022 05:01), Max: 98.5 (29 May 2022 18:09)  HR: 68 (30 May 2022 05:01) (68 - 106)  BP: 105/69 (30 May 2022 05:01) (105/69 - 157/89)  BP(mean): --  RR: 18 (30 May 2022 05:01) (16 - 18)  SpO2: 95% (30 May 2022 05:01) (95% - 99%)    PHYSICAL EXAM  General: NAD, AA0x3    Lower Extremity Focused:  *Limited due to pt noncompliance w/ in depth exam 2/2 severe tenderness to wounds*  Vasc: 2/4 DP PT to R. 2/4 DP Left, unable to assess PT 2/2 proximity of ulcer and severe tenderness to touch. Diffuse erythema present to LLE  Derm: LLE: Superficial distal anterior leg wound present, fibrogranular base w/ intact borders. Superficial medial mal wounds x 2 present, fibrous base w/ large eschar. Serous drainage w/ malodor present. Unable to asses PTB 2/2 pt pain and refusal.   Neuro: Unable to assess  MSK: +severe TTP of ulcers. Unable to assess muscle strength.     RADIOLOGY  CT negative for soft tissue gas or fluid collections. Endorses LLE cellulitis and ulcers.

## 2022-05-30 NOTE — PROGRESS NOTE ADULT - PROBLEM SELECTOR PLAN 1
Hx of venous stasis and non pressure ulcer and cellulitis and ? osteomyelitis for unknown duration, was getting CTX start date 5/23 for 10 days by PICCLine . Presented to the ED due to increase pain and unable to walk. reported fever at rehab but in ED, afebrie and no leukocytosis. Do not meet SIRS criteria. elevated CRP and ESR on admission.   -f/u BCX  and wound culture sent by surgery  - C/W IV Vancomycin: RUN IT SLOW in 2 hours AND GIVE PO BENADRYL BEFORE VANC INFUSION.   - C/W IV Zosyn  -f/u Podiatry recs (consulted)  -f/u Vascular recs --> no acute surgical intervention Hx of venous stasis and non pressure ulcer and cellulitis and ? osteomyelitis for unknown duration, was getting CTX start date 5/23 for 10 days by PICCLine . Presented to the ED due to increase pain and unable to walk. reported fever at rehab but in ED, afebrie and no leukocytosis. Do not meet SIRS criteria. elevated CRP and ESR on admission.   -f/u BCX  and wound culture sent by surgery  - C/W IV Vancomycin: RUN IT SLOW in 2 hours AND GIVE PO BENADRYL BEFORE VANC INFUSION.   -f/u Podiatry recs (consulted)  -f/u Vascular recs --> no acute surgical intervention

## 2022-05-31 LAB
ANION GAP SERPL CALC-SCNC: 9 MMOL/L — SIGNIFICANT CHANGE UP (ref 5–17)
BASOPHILS # BLD AUTO: 0.04 K/UL — SIGNIFICANT CHANGE UP (ref 0–0.2)
BASOPHILS NFR BLD AUTO: 0.6 % — SIGNIFICANT CHANGE UP (ref 0–2)
BUN SERPL-MCNC: 19 MG/DL — SIGNIFICANT CHANGE UP (ref 7–23)
CALCIUM SERPL-MCNC: 9.4 MG/DL — SIGNIFICANT CHANGE UP (ref 8.4–10.5)
CHLORIDE SERPL-SCNC: 103 MMOL/L — SIGNIFICANT CHANGE UP (ref 96–108)
CO2 SERPL-SCNC: 23 MMOL/L — SIGNIFICANT CHANGE UP (ref 22–31)
CREAT SERPL-MCNC: 0.76 MG/DL — SIGNIFICANT CHANGE UP (ref 0.5–1.3)
EGFR: 82 ML/MIN/1.73M2 — SIGNIFICANT CHANGE UP
EOSINOPHIL # BLD AUTO: 0.14 K/UL — SIGNIFICANT CHANGE UP (ref 0–0.5)
EOSINOPHIL NFR BLD AUTO: 2.3 % — SIGNIFICANT CHANGE UP (ref 0–6)
GLUCOSE SERPL-MCNC: 95 MG/DL — SIGNIFICANT CHANGE UP (ref 70–99)
HCT VFR BLD CALC: 36.7 % — SIGNIFICANT CHANGE UP (ref 34.5–45)
HGB BLD-MCNC: 11.5 G/DL — SIGNIFICANT CHANGE UP (ref 11.5–15.5)
IMM GRANULOCYTES NFR BLD AUTO: 1.6 % — HIGH (ref 0–1.5)
LYMPHOCYTES # BLD AUTO: 1.19 K/UL — SIGNIFICANT CHANGE UP (ref 1–3.3)
LYMPHOCYTES # BLD AUTO: 19.3 % — SIGNIFICANT CHANGE UP (ref 13–44)
MAGNESIUM SERPL-MCNC: 2.2 MG/DL — SIGNIFICANT CHANGE UP (ref 1.6–2.6)
MCHC RBC-ENTMCNC: 28.8 PG — SIGNIFICANT CHANGE UP (ref 27–34)
MCHC RBC-ENTMCNC: 31.3 GM/DL — LOW (ref 32–36)
MCV RBC AUTO: 92 FL — SIGNIFICANT CHANGE UP (ref 80–100)
MONOCYTES # BLD AUTO: 0.48 K/UL — SIGNIFICANT CHANGE UP (ref 0–0.9)
MONOCYTES NFR BLD AUTO: 7.8 % — SIGNIFICANT CHANGE UP (ref 2–14)
NEUTROPHILS # BLD AUTO: 4.21 K/UL — SIGNIFICANT CHANGE UP (ref 1.8–7.4)
NEUTROPHILS NFR BLD AUTO: 68.4 % — SIGNIFICANT CHANGE UP (ref 43–77)
NRBC # BLD: 0 /100 WBCS — SIGNIFICANT CHANGE UP (ref 0–0)
PLATELET # BLD AUTO: 466 K/UL — HIGH (ref 150–400)
POTASSIUM SERPL-MCNC: 5.1 MMOL/L — SIGNIFICANT CHANGE UP (ref 3.5–5.3)
POTASSIUM SERPL-SCNC: 5.1 MMOL/L — SIGNIFICANT CHANGE UP (ref 3.5–5.3)
RBC # BLD: 3.99 M/UL — SIGNIFICANT CHANGE UP (ref 3.8–5.2)
RBC # FLD: 13.3 % — SIGNIFICANT CHANGE UP (ref 10.3–14.5)
SODIUM SERPL-SCNC: 135 MMOL/L — SIGNIFICANT CHANGE UP (ref 135–145)
VANCOMYCIN TROUGH SERPL-MCNC: 20.8 UG/ML — HIGH (ref 10–20)
WBC # BLD: 6.16 K/UL — SIGNIFICANT CHANGE UP (ref 3.8–10.5)
WBC # FLD AUTO: 6.16 K/UL — SIGNIFICANT CHANGE UP (ref 3.8–10.5)

## 2022-05-31 PROCEDURE — 99233 SBSQ HOSP IP/OBS HIGH 50: CPT | Mod: GC

## 2022-05-31 RX ORDER — OXYCODONE HYDROCHLORIDE 5 MG/1
5 TABLET ORAL EVERY 6 HOURS
Refills: 0 | Status: DISCONTINUED | OUTPATIENT
Start: 2022-05-31 | End: 2022-06-01

## 2022-05-31 RX ORDER — TRAMADOL HYDROCHLORIDE 50 MG/1
25 TABLET ORAL EVERY 8 HOURS
Refills: 0 | Status: DISCONTINUED | OUTPATIENT
Start: 2022-05-31 | End: 2022-06-01

## 2022-05-31 RX ADMIN — OXYCODONE HYDROCHLORIDE 5 MILLIGRAM(S): 5 TABLET ORAL at 16:41

## 2022-05-31 RX ADMIN — FAMOTIDINE 20 MILLIGRAM(S): 10 INJECTION INTRAVENOUS at 11:18

## 2022-05-31 RX ADMIN — Medication 650 MILLIGRAM(S): at 23:23

## 2022-05-31 RX ADMIN — Medication 650 MILLIGRAM(S): at 22:23

## 2022-05-31 RX ADMIN — MORPHINE SULFATE 1 MILLIGRAM(S): 50 CAPSULE, EXTENDED RELEASE ORAL at 11:15

## 2022-05-31 RX ADMIN — TRAMADOL HYDROCHLORIDE 25 MILLIGRAM(S): 50 TABLET ORAL at 13:47

## 2022-05-31 RX ADMIN — MORPHINE SULFATE 1 MILLIGRAM(S): 50 CAPSULE, EXTENDED RELEASE ORAL at 07:16

## 2022-05-31 RX ADMIN — MORPHINE SULFATE 1 MILLIGRAM(S): 50 CAPSULE, EXTENDED RELEASE ORAL at 10:57

## 2022-05-31 RX ADMIN — TRAMADOL HYDROCHLORIDE 25 MILLIGRAM(S): 50 TABLET ORAL at 12:47

## 2022-05-31 RX ADMIN — Medication 1 TABLET(S): at 11:18

## 2022-05-31 RX ADMIN — OXYCODONE HYDROCHLORIDE 5 MILLIGRAM(S): 5 TABLET ORAL at 17:40

## 2022-05-31 RX ADMIN — Medication 81 MILLIGRAM(S): at 11:18

## 2022-05-31 RX ADMIN — RIVAROXABAN 10 MILLIGRAM(S): KIT at 11:18

## 2022-05-31 NOTE — PROGRESS NOTE ADULT - PROBLEM SELECTOR PLAN 4
AAox4.  was seen by psych at rehab, has been stable. Patient is very difficult to deal with.  From psych note from rehab: Pt was born in [place]. [place] in [place] little kid, adopted and has no family because she was adopted. She's been  and has 3 kids, 2 works in [place] and one works in [employer]. She went to [employer], and [employer] [place] graduated with bachor's degree. 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   -Will consult psych
AAox4.  was seen by psych at rehab, has been stable. Patient is very difficult to deal with.  From psych note from rehab: Pt was born in [place]. [place] in [place] as little kid, adopted and has no family because she was adopted. She's been  and has 3 kids, 2 works in [place] and one works in [employer]. She went to [employer], and [employer] graduated with bachor's degree. 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   -Will consult psych

## 2022-05-31 NOTE — PROGRESS NOTE ADULT - ASSESSMENT
Pt is 76 yr old female, history of osteomyelitis (unclear for how long), adjustment disorder off from meds, presents to the Emergency Department from [facility], with leg wounds was on CTX IV for cellulitis, admitted to the [facility] for worsening Cellulitis management

## 2022-05-31 NOTE — PROGRESS NOTE ADULT - PROBLEM SELECTOR PLAN 1
Hx of venous stasis and non pressure ulcer and cellulitis and ? osteomyelitis for unknown duration, was getting CTX start date 5/23 for 10 days by PICCLine . Presented to the ED due to increase pain and unable to walk. reported fever at rehab but in ED, afebrie and no leukocytosis. Do not meet SIRS criteria. elevated CRP and ESR on admission.   -f/u BCX  and wound culture sent by surgery  -s/p Vancomycin c/w Bactrim  -f/u Podiatry recs (consulted)  -f/u Vascular recs --> no acute surgical intervention

## 2022-05-31 NOTE — CONSULT NOTE ADULT - SUBJECTIVE AND OBJECTIVE BOX
Patient is a 77y old  Female who presents with a chief complaint of Leg Cellulitis (31 May 2022 10:38)        HPI:  Pt is 76 yr old female, history of osteomyelitis (unclear for how long), adjustment disorder/anxiety, presents to the Emergency Department from [facility] [place] [facility], with leg wounds. Pt has multiple wounds to LLE reported to be non-pressure ulcers. Patient is very difficult to get information. 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 No hx of diabetes noted. Per notes, she has been on IV ceftriaxone daily with PICCLIne since 5/23 total 10 days. In last 24-48 hours pt with increased pain, subjective fevers, foul smell from wounds. Pt reports she has been doing wound care as instructed. Per rehab documents, wound care used to see her at rehab and all three wounds on Left anterior leg/left medical upper leg, left medial lower leg and left lateral leg was treated by  Santyl, mupirocin and alginate.  Per note exam from rehab doctor on 5/26 wounds had scant amount of drainage, no odor  -In the ED when pt was getting vancomycin, she was having diffuse itching and redness and cold, vanco dc'd given benadryl / pepcid. In the ED, confirmed no allergies listed multiple places on her rehab records.    -In the ED, podiatry consulted and reviewed w podiatry. They agree for admission for iv abx - broad spectrum under medicine and they will see pt in AM. for dressing - telfa non-adherant and kerlix    Patient denies HA, vision changes, SOB, CP, abdominal pain, urinary/BM changes. Just has complain of itchiness,   ED course:   VS: Tmax: 98.5, HR 81 /85, RR 16/99% RA.   WBC 9.34, Hb 11.6, Plt 421, Na 136, K 4.1, BUN 22, Cr 0.68, Pr 8.5 Alkph 152, .7 , Lacatet 1.3,   Imaging: Was not done in the ED, since pt refused. Refused ECG   Treatment: S/p Vanc 1250 and Zosyn in ED , tylenol, Benadryl for reaction to vanc.    (29 May 2022 22:02)      PAST MEDICAL & SURGICAL HISTORY:  Herniated disc, cervical      Osteomyelitis of ankle or foot, acute      Chronic venous stasis dermatitis      Anxiety      Thrombophlebitis      History of adjustment disorder      No significant past surgical history          MEDICATIONS  (STANDING):  aspirin  chewable 81 milliGRAM(s) Oral daily  famotidine    Tablet 20 milliGRAM(s) Oral daily  influenza  Vaccine (HIGH DOSE) 0.7 milliLiter(s) IntraMuscular once  rivaroxaban 10 milliGRAM(s) Oral daily  trimethoprim  160 mG/sulfamethoxazole 800 mG 1 Tablet(s) Oral every 12 hours    MEDICATIONS  (PRN):  acetaminophen     Tablet .. 650 milliGRAM(s) Oral every 6 hours PRN Mild Pain (1 - 3)  morphine  - Injectable 1 milliGRAM(s) IV Push every 4 hours PRN Severe Pain (7 - 10)  traMADol 25 milliGRAM(s) Oral every 8 hours PRN Moderate Pain (4 - 6)      FAMILY HISTORY:    CBC Full  -  ( 31 May 2022 07:09 )  WBC Count : 6.16 K/uL  RBC Count : 3.99 M/uL  Hemoglobin : 11.5 g/dL  Hematocrit : 36.7 %  Platelet Count - Automated : 466 K/uL  Mean Cell Volume : 92.0 fl  Mean Cell Hemoglobin : 28.8 pg  Mean Cell Hemoglobin Concentration : 31.3 gm/dL  Auto Neutrophil # : 4.21 K/uL  Auto Lymphocyte # : 1.19 K/uL  Auto Monocyte # : 0.48 K/uL  Auto Eosinophil # : 0.14 K/uL  Auto Basophil # : 0.04 K/uL  Auto Neutrophil % : 68.4 %  Auto Lymphocyte % : 19.3 %  Auto Monocyte % : 7.8 %  Auto Eosinophil % : 2.3 %  Auto Basophil % : 0.6 %      05-31    135  |  103  |  19  ----------------------------<  95  5.1   |  23  |  0.76    Ca    9.4      31 May 2022 07:09  Phos  3.9     05-30  Mg     2.2     05-31    TPro  7.2  /  Alb  3.4  /  TBili  0.4  /  DBili  x   /  AST  16  /  ALT  18  /  AlkPhos  120  05-30            Radiology:    < from: CT Lower Extremity No Cont, Left (05.30.22 @ 04:18) >  ACC: [id number] EXAM:  CT LWR EXT LT                          PROCEDURE DATE:  05/30/2022          INTERPRETATION:  HISTORY: Lower extremity ulcers.    Helical CT imaging of the left lower extremity from level of the knee to   the level of the foot was performed after the intravenous administration   of contrast. 100 cc of Isovue-370 was administered intravenously. 0 cc   was discarded. Sagittal and coronal reformats were provided. 3-D   reformats were performed on a separate workstation.    No prior studies available for comparison.    FINDINGS:  Bones/joints: There is no evidence of acute fracture or dislocation.   There is no osseous erosion, destruction, periosteal reaction to suggest   osteomyelitis. There is moderate compartmental arthrosis of the knee. The   visualized joint spaces are preserved.    Soft tissues: Foci of skin thickening and surface irregularity are seen    throughout the distal third of the calf consistent with the reported   history of  ulcerations. There is reticular subcutaneous fat stranding throughout the   calf, most pronounced in the distal calf, which could reflect cellulitis   and/or  edema. There is no subcutaneous air. Ill-defined fluid attenuation is   seen within the region of skin thickening alongthe posterior medial   aspect of the distal lower leg without evidence of a drainable fluid   collection. As may be related to developing bleb. No drainable fluid   collection is seen within the subcutaneous fat or myofascial planes.    IMPRESSION:  1. No subcutaneous air.  2. Findings suggesting cellulitis and/or edema in the left calf with foci   of edematous skin thickening and skin surface irregularity consistent   with reported ulcerations.  3. No drainable fluid collection. Ill-defined fluid attenuation is seen   within the region of the skin thickening along the posterior medial   aspect of the distal lower leg which may be related to a developing bleb.                  Vital Signs Last 24 Hrs  T(C): 37.3 (31 May 2022 12:55), Max: 37.3 (31 May 2022 12:55)  T(F): 99.2 (31 May 2022 12:55), Max: 99.2 (31 May 2022 12:55)  HR: 81 (31 May 2022 12:55) (72 - 81)  BP: 122/75 (31 May 2022 12:55) (110/65 - 122/79)  BP(mean): --  RR: 17 (31 May 2022 12:55) (16 - 18)  SpO2: 95% (31 May 2022 12:55) (95% - 97%)        REVIEW OF SYSTEMS:      CONSTITUTIONAL: No fever, weight loss, or fatigue  EYES: No eye pain, visual disturbances, or discharge  ENMT:  No difficulty hearing, tinnitus, vertigo; No sinus or throat pain  NECK: No pain or stiffness  BREASTS: No pain, masses, or nipple discharge  RESPIRATORY: No cough, wheezing, chills or hemoptysis; No shortness of breath  CARDIOVASCULAR: No chest pain, palpitations, dizziness, or leg swelling  GASTROINTESTINAL: No abdominal or epigastric pain. No nausea, vomiting, or hematemesis; No diarrhea or constipation. No melena or hematochezia.  GENITOURINARY: No dysuria, frequency, hematuria, or incontinence  NEUROLOGICAL: No headaches, memory loss, loss of strength, numbness, or tremors  SKIN: No itching, burning, rashes, or lesions   LYMPH NODES: No enlarged glands  ENDOCRINE: No heat or cold intolerance; No hair loss  MUSCULOSKELETAL: No joint pain or swelling; No muscle, back, or extremity pain  PSYCHIATRIC: No depression, anxiety, mood swings, or difficulty sleeping  HEME/LYMPH: No easy bruising, or bleeding gums  ALLERGY AND IMMUNOLOGIC: No hives or eczema  VASCULAR:   per HPI          Physical Exam:  77 yo  woman  lying in semi Oliveros's position, awake, alert,  refusing exam                PM&R Impression:    1) LLE cellulits  2) no focal weakness    Recommendations/ Plan :    1) Physical / Occupational therapy focusing on therapeutic exercises, bed mobility/transfer out of bed evaluation, progressive ambulation with assistive devices prn.    2) Anticipated Disposition Plan/Recs  :    return to NH/[facility]

## 2022-05-31 NOTE — PROGRESS NOTE ADULT - ASSESSMENT
77 yo F pmhx of OM (pt is poor historian, noted she was being treated at [facility] but unsure of how long or when treatment began), adjustment disorder/anxiety, presents to ED from [facility] at [facility], with Left leg wounds. Per rehab notes, has been on IV ceftriaxone daily with PICC Line since 5/23 total 10 days. Podiatry consulted for wound eval, r/o OM, r/o gas. Of note, CT negative for gas or drainable collections; endorses LLE cellulitis and ulceration. Unable to assess PTB status of wounds, low suspicion for OM however as wounds appear superficial in nature w/ intact fibrogranular base/ eschar. Of note, pt is afebrile w/ WBC wnl on IV ABX.     Plan:  - IV ABX per primary team  - light mechanical debridement w/ sterile gauze to wounds  - Local wound care: Santyl + Telfa nonadherant, Kerlix wrap, + light ACE compression (pt noncompliant to increased compression)  - rest of care per primary    Podiatry following. Plan d/w primary team. Plan d/w attending.

## 2022-05-31 NOTE — CONSULT NOTE ADULT - ASSESSMENT
per Internal Medicine     76 yr old female, history of osteomyelitis (unclear for how long), adjustment disorder off from meds, presents to the Emergency Department from [facility], with leg wounds was on CTX IV for cellulitis, admitted to the [facility] for worsening Cellulitis management     Problem/Plan - 1   ·  Problem: Cellulitis.   ·  Plan: Hx of venous stasis and non pressure ulcer and cellulitis and ? osteomyelitis for unknown duration, was getting CTX start date 5/23 for 10 days by [facility] . Presented to the ED due to increase pain and unable to walk. reported fever at rehab but in ED, afebrie and no leukocytosis. Do not meet SIRS criteria. elevated CRP and ESR on admission.   -f/u BCX  and wound culture sent by surgery  -s/p Vancomycin c/w Bactrim  -f/u Podiatry recs (consulted)  -f/u Vascular recs --> no acute surgical intervention.    Problem/Plan - 2   ·  Problem: Chronic venous stasis dermatitis.   ·  Plan: as above.    Problem/Plan - 3   ·  Problem: Thrombophlebitis.   ·  Plan: Plt 421, Hb 11.6 on admission . unclear where is the thrombophlebitis  Home med :Xarelto 10mg daily, ASA 81 daily  -continue home meds once cleared from surgical standpoint.    Problem/Plan - 4   ·  Problem: Adjustment disorder with anxiety.   ·  Plan: AAox4.  was seen by psych at rehab, has been stable. Patient is very difficult to deal with.  From psych note from rehab: Pt was born in [place]. [place] in [place] as little kid, adopted and has no family because she was adopted. She's been  and has 3 kids, 2 works in [place] and one works in [place]. She went to [employer], and [employer] graduated with bachor's degree. 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   -Will consult psych.    Problem/Plan - 5   ·  Problem: Healthcare maintenance.   ·  Plan: F: None   E: replet as needed   N: regular   DVT ppx: Hold Xarelto pending surgical eval   GI ppx: Famotidine  Dispo: [facility]  
77 yo F pmhx of OM (pt is poor historian, noted she was being treated at [facility] but unsure of how long or when treatment began), adjustment disorder/anxiety, presents to ED from [facility], with Left leg wounds. Per rehab notes, has been on IV ceftriaxone daily with PICC Line since 5/23 total 10 days. Podiatry consulted for wound eval, r/o OM, r/o gas. Of note, CT negative for gas or drainable collections; endorses LLE cellulitis and ulceration. Unable to assess PTB status of wounds, low suspicion for OM however as wounds appear superficial in nature w/ intact fibrogranular base/ eschar. Of note, pt is afebrile w/ WBC wnl on IV ABX.     Plan:  - Pt evaluated and chart reviewed  - irrigated wounds w/ betadine + sterile saline solution  - attempted mechanical debridement but pt was noncompliant 2/2 severe tenderness to wounds  - Local wound care: Santyl + Telfa nonadherant, Kerlix wrap, + light ACE compression (pt noncompliant to increased compression)  - IV ABX per primary team  - rest of care per primary    Podiatry following. Plan d/w primary team. Plan d/w attending.

## 2022-05-31 NOTE — PROGRESS NOTE ADULT - PROBLEM SELECTOR PLAN 3
Plt 421, Hb 11.6 on admission . unclear where is the thrombophlebitis  Home med :Xarelto 10mg daily, ASA 81 daily  -continue home meds once cleared from surgical standpoint
Plt 421, Hb 11.6 on admission . unclear where is the thrombophlebitis  Home med :Xarelto 10mg daily, ASA 81 daily  -continue home meds once cleared from surgical standpoint

## 2022-06-01 VITALS
DIASTOLIC BLOOD PRESSURE: 72 MMHG | OXYGEN SATURATION: 94 % | HEART RATE: 73 BPM | SYSTOLIC BLOOD PRESSURE: 127 MMHG | RESPIRATION RATE: 20 BRPM | TEMPERATURE: 98 F

## 2022-06-01 LAB
-  AMPICILLIN/SULBACTAM: SIGNIFICANT CHANGE UP
-  AMPICILLIN: SIGNIFICANT CHANGE UP
-  AMPICILLIN: SIGNIFICANT CHANGE UP
-  CEFAZOLIN: SIGNIFICANT CHANGE UP
-  CEFEPIME: SIGNIFICANT CHANGE UP
-  CEFTRIAXONE: SIGNIFICANT CHANGE UP
-  CIPROFLOXACIN: SIGNIFICANT CHANGE UP
-  ERTAPENEM: SIGNIFICANT CHANGE UP
-  GENTAMICIN: SIGNIFICANT CHANGE UP
-  PIPERACILLIN/TAZOBACTAM: SIGNIFICANT CHANGE UP
-  TOBRAMYCIN: SIGNIFICANT CHANGE UP
-  TRIMETHOPRIM/SULFAMETHOXAZOLE: SIGNIFICANT CHANGE UP
-  VANCOMYCIN: SIGNIFICANT CHANGE UP
ALBUMIN SERPL ELPH-MCNC: 3.2 G/DL — LOW (ref 3.3–5)
ALP SERPL-CCNC: 107 U/L — SIGNIFICANT CHANGE UP (ref 40–120)
ALT FLD-CCNC: 14 U/L — SIGNIFICANT CHANGE UP (ref 10–45)
ANION GAP SERPL CALC-SCNC: 9 MMOL/L — SIGNIFICANT CHANGE UP (ref 5–17)
ANISOCYTOSIS BLD QL: SLIGHT — SIGNIFICANT CHANGE UP
AST SERPL-CCNC: 14 U/L — SIGNIFICANT CHANGE UP (ref 10–40)
BASOPHILS # BLD AUTO: 0.04 K/UL — SIGNIFICANT CHANGE UP (ref 0–0.2)
BASOPHILS NFR BLD AUTO: 0.9 % — SIGNIFICANT CHANGE UP (ref 0–2)
BILIRUB SERPL-MCNC: 0.2 MG/DL — SIGNIFICANT CHANGE UP (ref 0.2–1.2)
BUN SERPL-MCNC: 15 MG/DL — SIGNIFICANT CHANGE UP (ref 7–23)
CALCIUM SERPL-MCNC: 9.2 MG/DL — SIGNIFICANT CHANGE UP (ref 8.4–10.5)
CHLORIDE SERPL-SCNC: 104 MMOL/L — SIGNIFICANT CHANGE UP (ref 96–108)
CO2 SERPL-SCNC: 24 MMOL/L — SIGNIFICANT CHANGE UP (ref 22–31)
CREAT SERPL-MCNC: 0.81 MG/DL — SIGNIFICANT CHANGE UP (ref 0.5–1.3)
CULTURE RESULTS: SIGNIFICANT CHANGE UP
EGFR: 76 ML/MIN/1.73M2 — SIGNIFICANT CHANGE UP
EOSINOPHIL # BLD AUTO: 0.07 K/UL — SIGNIFICANT CHANGE UP (ref 0–0.5)
EOSINOPHIL NFR BLD AUTO: 1.7 % — SIGNIFICANT CHANGE UP (ref 0–6)
GIANT PLATELETS BLD QL SMEAR: PRESENT — SIGNIFICANT CHANGE UP
GLUCOSE SERPL-MCNC: 81 MG/DL — SIGNIFICANT CHANGE UP (ref 70–99)
HCT VFR BLD CALC: 35 % — SIGNIFICANT CHANGE UP (ref 34.5–45)
HGB BLD-MCNC: 10.9 G/DL — LOW (ref 11.5–15.5)
HYPOCHROMIA BLD QL: SIGNIFICANT CHANGE UP
LYMPHOCYTES # BLD AUTO: 1.18 K/UL — SIGNIFICANT CHANGE UP (ref 1–3.3)
LYMPHOCYTES # BLD AUTO: 27.8 % — SIGNIFICANT CHANGE UP (ref 13–44)
MACROCYTES BLD QL: SLIGHT — SIGNIFICANT CHANGE UP
MAGNESIUM SERPL-MCNC: 2.1 MG/DL — SIGNIFICANT CHANGE UP (ref 1.6–2.6)
MANUAL SMEAR VERIFICATION: SIGNIFICANT CHANGE UP
MCHC RBC-ENTMCNC: 28.3 PG — SIGNIFICANT CHANGE UP (ref 27–34)
MCHC RBC-ENTMCNC: 31.1 GM/DL — LOW (ref 32–36)
MCV RBC AUTO: 90.9 FL — SIGNIFICANT CHANGE UP (ref 80–100)
METAMYELOCYTES # FLD: 0.9 % — HIGH (ref 0–0)
METHOD TYPE: SIGNIFICANT CHANGE UP
METHOD TYPE: SIGNIFICANT CHANGE UP
MICROCYTES BLD QL: SLIGHT — SIGNIFICANT CHANGE UP
MONOCYTES # BLD AUTO: 0.3 K/UL — SIGNIFICANT CHANGE UP (ref 0–0.9)
MONOCYTES NFR BLD AUTO: 7 % — SIGNIFICANT CHANGE UP (ref 2–14)
NEUTROPHILS # BLD AUTO: 2.61 K/UL — SIGNIFICANT CHANGE UP (ref 1.8–7.4)
NEUTROPHILS NFR BLD AUTO: 61.7 % — SIGNIFICANT CHANGE UP (ref 43–77)
ORGANISM # SPEC MICROSCOPIC CNT: SIGNIFICANT CHANGE UP
OVALOCYTES BLD QL SMEAR: SLIGHT — SIGNIFICANT CHANGE UP
PHOSPHATE SERPL-MCNC: 4 MG/DL — SIGNIFICANT CHANGE UP (ref 2.5–4.5)
PLAT MORPH BLD: ABNORMAL
PLATELET # BLD AUTO: 449 K/UL — HIGH (ref 150–400)
POIKILOCYTOSIS BLD QL AUTO: SLIGHT — SIGNIFICANT CHANGE UP
POLYCHROMASIA BLD QL SMEAR: SLIGHT — SIGNIFICANT CHANGE UP
POTASSIUM SERPL-MCNC: 4.1 MMOL/L — SIGNIFICANT CHANGE UP (ref 3.5–5.3)
POTASSIUM SERPL-SCNC: 4.1 MMOL/L — SIGNIFICANT CHANGE UP (ref 3.5–5.3)
PROT SERPL-MCNC: 7.1 G/DL — SIGNIFICANT CHANGE UP (ref 6–8.3)
RBC # BLD: 3.85 M/UL — SIGNIFICANT CHANGE UP (ref 3.8–5.2)
RBC # FLD: 13.2 % — SIGNIFICANT CHANGE UP (ref 10.3–14.5)
RBC BLD AUTO: ABNORMAL
SODIUM SERPL-SCNC: 137 MMOL/L — SIGNIFICANT CHANGE UP (ref 135–145)
SPECIMEN SOURCE: SIGNIFICANT CHANGE UP
WBC # BLD: 4.23 K/UL — SIGNIFICANT CHANGE UP (ref 3.8–10.5)
WBC # FLD AUTO: 4.23 K/UL — SIGNIFICANT CHANGE UP (ref 3.8–10.5)

## 2022-06-01 PROCEDURE — 96375 TX/PRO/DX INJ NEW DRUG ADDON: CPT

## 2022-06-01 PROCEDURE — 96365 THER/PROPH/DIAG IV INF INIT: CPT

## 2022-06-01 PROCEDURE — 73700 CT LOWER EXTREMITY W/O DYE: CPT | Mod: MA

## 2022-06-01 PROCEDURE — 85025 COMPLETE CBC W/AUTO DIFF WBC: CPT

## 2022-06-01 PROCEDURE — 87186 SC STD MICRODIL/AGAR DIL: CPT

## 2022-06-01 PROCEDURE — 80048 BASIC METABOLIC PNL TOTAL CA: CPT

## 2022-06-01 PROCEDURE — 99285 EMERGENCY DEPT VISIT HI MDM: CPT

## 2022-06-01 PROCEDURE — 85730 THROMBOPLASTIN TIME PARTIAL: CPT

## 2022-06-01 PROCEDURE — 85610 PROTHROMBIN TIME: CPT

## 2022-06-01 PROCEDURE — 83735 ASSAY OF MAGNESIUM: CPT

## 2022-06-01 PROCEDURE — 82550 ASSAY OF CK (CPK): CPT

## 2022-06-01 PROCEDURE — 86803 HEPATITIS C AB TEST: CPT

## 2022-06-01 PROCEDURE — 86140 C-REACTIVE PROTEIN: CPT

## 2022-06-01 PROCEDURE — 80202 ASSAY OF VANCOMYCIN: CPT

## 2022-06-01 PROCEDURE — 85652 RBC SED RATE AUTOMATED: CPT

## 2022-06-01 PROCEDURE — 87040 BLOOD CULTURE FOR BACTERIA: CPT

## 2022-06-01 PROCEDURE — 87070 CULTURE OTHR SPECIMN AEROBIC: CPT

## 2022-06-01 PROCEDURE — 36415 COLL VENOUS BLD VENIPUNCTURE: CPT

## 2022-06-01 PROCEDURE — 80053 COMPREHEN METABOLIC PANEL: CPT

## 2022-06-01 PROCEDURE — 87635 SARS-COV-2 COVID-19 AMP PRB: CPT

## 2022-06-01 PROCEDURE — 99239 HOSP IP/OBS DSCHRG MGMT >30: CPT

## 2022-06-01 PROCEDURE — 84100 ASSAY OF PHOSPHORUS: CPT

## 2022-06-01 PROCEDURE — 83605 ASSAY OF LACTIC ACID: CPT

## 2022-06-01 RX ORDER — OXYCODONE HYDROCHLORIDE 5 MG/1
1 TABLET ORAL
Qty: 0 | Refills: 0 | DISCHARGE
Start: 2022-06-01

## 2022-06-01 RX ORDER — FAMOTIDINE 10 MG/ML
1 INJECTION INTRAVENOUS
Qty: 0 | Refills: 0 | DISCHARGE
Start: 2022-06-01

## 2022-06-01 RX ORDER — TRAMADOL HYDROCHLORIDE 50 MG/1
0.5 TABLET ORAL
Qty: 0 | Refills: 0 | DISCHARGE
Start: 2022-06-01

## 2022-06-01 RX ADMIN — OXYCODONE HYDROCHLORIDE 5 MILLIGRAM(S): 5 TABLET ORAL at 08:54

## 2022-06-01 RX ADMIN — OXYCODONE HYDROCHLORIDE 5 MILLIGRAM(S): 5 TABLET ORAL at 09:50

## 2022-06-01 RX ADMIN — Medication 1 TABLET(S): at 00:41

## 2022-06-01 RX ADMIN — Medication 1 TABLET(S): at 12:08

## 2022-06-01 NOTE — DISCHARGE NOTE PROVIDER - NSDCMRMEDTOKEN_GEN_ALL_CORE_FT
acetaminophen 500 mg oral tablet: 1 tab(s) orally every 8 hours, As Needed   Aspercreme: Apply topically to affected area once a day  aspirin 81 mg oral tablet: orally once a day  cefTRIAXone 1 g intravenous injection: intravenous once a day  melatonin 5 mg oral tablet, extended release: 1 tab(s) orally once a day (at bedtime)  mupirocin 2% topical ointment: Apply topically to affected area 3 times a day  Santyl 250 units/g topical ointment: Apply topically to affected area once a day  Xarelto 10 mg oral tablet: 1 tab(s) orally once a day   acetaminophen 500 mg oral tablet: 1 tab(s) orally every 8 hours, As Needed   Aspercreme: Apply topically to affected area once a day  aspirin 81 mg oral tablet: orally once a day  famotidine 20 mg oral tablet: 1 tab(s) orally once a day  melatonin 5 mg oral tablet, extended release: 1 tab(s) orally once a day (at bedtime)  mupirocin 2% topical ointment: Apply topically to affected area 3 times a day  oxyCODONE 5 mg oral tablet: 1 tab(s) orally every 6 hours, As needed, Severe Pain (7 - 10)  Santyl 250 units/g topical ointment: Apply topically to affected area once a day  sulfamethoxazole-trimethoprim 800 mg-160 mg oral tablet: 1 tab(s) orally every 12 hours to end on June 7th.   traMADol 50 mg oral tablet: 0.5 tab(s) orally every 8 hours, As needed, Moderate Pain (4 - 6)  Xarelto 10 mg oral tablet: 1 tab(s) orally once a day

## 2022-06-01 NOTE — DISCHARGE NOTE PROVIDER - NSDCFUADDAPPT_GEN_ALL_CORE_FT
Please follow up with your primary care provider within 1-2 weeks of discharge from the hospital.     Please follow up with the podiatrists at [facility]. They can be reached at  Please follow up with your primary care provider within 1-2 weeks of discharge from the hospital.     Please follow up with your podiatrist of with the podiatrists at [facility]. They can be reached at:   [facility]  [address] [place] [address] [phone]

## 2022-06-01 NOTE — DISCHARGE NOTE NURSING/CASE MANAGEMENT/SOCIAL WORK - PATIENT PORTAL LINK FT
You can access the FollowMyHealth Patient Portal offered by [facility] by registering at the following website: [email or web address]. By joining [facility]’s FollowMyHealth portal, you will also be able to view your health information using other applications (apps) compatible with our system.

## 2022-06-01 NOTE — DISCHARGE NOTE NURSING/CASE MANAGEMENT/SOCIAL WORK - NSDCFUADDAPPT_GEN_ALL_CORE_FT
Please follow up with your primary care provider within 1-2 weeks of discharge from the hospital.     Please follow up with your podiatrist of with the podiatrists at [facility]. They can be reached at:   [facility]  [address] [place] [address] [phone]

## 2022-06-01 NOTE — DISCHARGE NOTE PROVIDER - HOSPITAL COURSE
Pt is 76 yr old female, history of osteomyelitis (unclear for how long), adjustment disorder/anxiety, presents to the Emergency Department from [facility]note 3[facility] at [facility], with leg wounds Pt is 76 yr old female, history of osteomyelitis (unclear for how long), adjustment disorder/anxiety, presents to the Emergency Department from [facility], with leg wounds, found to have worsening cellulitis of LLE. CT negative for osteomyelitis.  Podiatry following, s/p Pt is 76 yr old female, history of osteomyelitis (unclear for how long), adjustment disorder/anxiety, presents to the Emergency Department from [facility], with leg wounds, found to have worsening cellulitis of LLE. CT negative for osteomyelitis.  Podiatry following, mechanical debridement attempted but pt non-complaint. Local wound care: Santyl + Telfa nonadherant, Kerlix wrap, + light ACE compression (pt noncompliant to increased compression). Vascular consulted, no intervention, elevation of LLE. Pt initially on Vancomycin due to purulence of LLE, transitioned to Bactrim oral regimen to continue until 6/7. Pt to be discharged to [facility].    Problem List:     #LLE Cellulitis: Podiatry following, mechanical debridement attempted but pt non-complaint. Vascular consulted, no intervention, elevation of LLE. Pt initially on Vancomycin due to purulence of LLE, transitioned to Bactrim oral regimen to continue until 6/7.    Wound Care Recs: Local wound care: Santyl + Telfa nonadherant, Kerlix wrap, + light ACE compression (pt noncompliant to increased compression).    #Thrombophlebitis: C/w Xarelto, ASA    #Adjustment Disorder: Not currently on medications, outpatient f/u.     New Medications: Bactrim course until 6/7.     Changes to old medications: None    Follow up: PCP, podiatry.     Discharge to: [facility]    Physical exam at time of discharge:     PHYSICAL EXAM:  GENERAL: NAD.  HEAD:  Atraumatic, Normocephalic  EYES: EOMI, PERRLA, conjunctiva and sclera clear  ENMT: No tonsillar erythema, exudates, or enlargement; Moist mucous membranes  NECK: Supple, No JVD, Normal thyroid  HEART: Regular rate and rhythm; No murmurs, rubs, or gallops  RESPIRATORY: CTA B/L, No W/R/R  ABDOMEN: Soft, Nontender, Nondistended; Bowel sounds present  NEUROLOGY: A&Ox3  EXTREMITIES:  2+ Peripheral Pulses, LLE wrapped, dressing c/d/i, ttp.

## 2022-06-01 NOTE — DISCHARGE NOTE NURSING/CASE MANAGEMENT/SOCIAL WORK - NSDCPEFALRISK_GEN_ALL_CORE
For information on Fall & Injury Prevention, visit: [email or web address] OR  [email or web address] OR  https://www.cdc.gov/steadi/patient.html

## 2022-06-01 NOTE — PROGRESS NOTE ADULT - ASSESSMENT
77 yo F pmhx of OM (pt is poor historian, noted she was being treated at [facility] but unsure of how long or when treatment began), adjustment disorder/anxiety, presents to ED from [facility], with Left leg wounds. Per rehab notes, has been on IV ceftriaxone daily with PICC Line since 5/23 total 10 days. Podiatry consulted for wound eval, r/o OM, r/o gas. Of note, CT negative for gas or drainable collections; endorses LLE cellulitis and ulceration. Unable to assess PTB status of wounds, low suspicion for OM however as wounds appear superficial in nature w/ intact fibrogranular base/ eschar. Of note, pt is afebrile w/ WBC wnl on ABX.     Plan:  - ABX per primary team  - Pt refused dressing change by podiatry and stated she wanted to do it herself pt applied bacitracin and telfa + kerlix and ace to her b/l wounds   - Wound care order placed   - rest of care per primary    Podiatry will sign off. Plan d/w attending.     Wound care: Flush b/l wounds with saline daily, pat dry with sterile guaze, apply bacitracin to b/l wounds, apply telfa pads to wounds and wrap with Kerlix and ACE  77 yo F pmhx of OM (pt is poor historian, noted she was being treated at [facility] but unsure of how long or when treatment began), adjustment disorder/anxiety, presents to ED from [facility], with Left leg wounds. Per rehab notes, has been on IV ceftriaxone daily with PICC Line since 5/23 total 10 days. Podiatry consulted for wound eval, r/o OM, r/o gas. Of note, CT negative for gas or drainable collections; endorses LLE cellulitis and ulceration. Unable to assess PTB status of wounds, low suspicion for OM however as wounds appear superficial in nature w/ intact fibrogranular base/ eschar. Of note, pt is afebrile w/ WBC wnl on ABX.     Plan:  - ABX per primary team  - Pt refused dressing change by podiatry and stated she wanted to do it herself pt applied bacitracin and telfa + kerlix and ace to her b/l wounds   - Wound care order placed   - rest of care per primary    Podiatry will sign off. Plan d/w attending.     Wound care: Flush b/l wounds with saline daily, pat dry with sterile guaze, apply bacitracin to b/l wounds, apply telfa pads to wounds and wrap with Kerlix and ACE     Pt can follow up with her outside podiatrist or at [facility] within 2 weeks  [facility]  [address] [place] [address] 75 yo F pmhx of OM (pt is poor historian, noted she was being treated at [facility] but unsure of how long or when treatment began), adjustment disorder/anxiety, presents to ED from [facility], with Left leg wounds. Per rehab notes, has been on IV ceftriaxone daily with PICC Line since 5/23 total 10 days. Podiatry consulted for wound eval, r/o OM, r/o gas. Of note, CT negative for gas or drainable collections; endorses LLE cellulitis and ulceration. Unable to assess PTB status of wounds, low suspicion for OM however as wounds appear superficial in nature w/ intact fibrogranular base/ eschar. Of note, pt is afebrile w/ WBC wnl on ABX.     Plan:  - ABX per primary team  - Pt refused dressing change by podiatry and stated she wanted to do it herself pt applied bacitracin and telfa + kerlix and ace to her b/l wounds   - Wound care order placed   - rest of care per primary    Podiatry will sign off. Plan d/w attending.     Wound care: Flush b/l wounds with saline daily, pat dry with sterile guaze, apply bacitracin to b/l wounds, apply telfa pads to wounds and wrap with Kerlix and ACE     Pt can follow up with her outside podiatrist or at [facility] within 2 weeks  [facility]  [address] [place] [address] [phone]

## 2022-06-01 NOTE — ADVANCED PRACTICE NURSE CONSULT - REASON FOR CONSULT
WOC nurse consult to assess left lower extremity cellulitis. Patient being followed by podiatry. WOCN consult not indicated at this time.

## 2022-06-01 NOTE — DISCHARGE NOTE PROVIDER - NSDCCPCAREPLAN_GEN_ALL_CORE_FT
PRINCIPAL DISCHARGE DIAGNOSIS  Diagnosis: Cellulitis of left lower leg  Assessment and Plan of Treatment: You came in with an infection of your left leg. While you were here, you were seen by our podiatrists and they were able to dress your wounds and keep them clean and dry. While at home, continue to use Santyl + Telfa nonadherant, Kerlix wraps, + light ACE bandage compression to continue to allow your leg to heal appropriately. Please finish your course of Bactrim which will continue until June 7th. Please follow up with your primary care provider and podiatrist outpatient for further care.

## 2022-06-01 NOTE — PROGRESS NOTE ADULT - SUBJECTIVE AND OBJECTIVE BOX
Patient is a 77y old  Female who presents with a chief complaint of Leg Cellulitis (31 May 2022 13:37)      INTERVAL HPI/ OVERNIGHT EVENTS: Pt seen and evaluated bedside. Pt refused to let podiatry team touch her dressings. Pt states her wounds are very painful.       LABS                        10.9   4.23  )-----------( 449      ( 01 Jun 2022 08:21 )             35.0     06-01    137  |  104  |  15  ----------------------------<  81  4.1   |  24  |  0.81    Ca    9.2      01 Jun 2022 08:21  Phos  4.0     06-01  Mg     2.1     06-01    TPro  7.1  /  Alb  3.2<L>  /  TBili  0.2  /  DBili  x   /  AST  14  /  ALT  14  /  AlkPhos  107  06-01        ICU Vital Signs Last 24 Hrs  T(C): 36.7 (01 Jun 2022 05:08), Max: 37.3 (31 May 2022 12:55)  T(F): 98 (01 Jun 2022 05:08), Max: 99.2 (31 May 2022 12:55)  HR: 62 (01 Jun 2022 05:08) (62 - 81)  BP: 111/67 (01 Jun 2022 05:08) (111/67 - 122/75)  BP(mean): --  ABP: --  ABP(mean): --  RR: 18 (01 Jun 2022 05:08) (17 - 18)  SpO2: 95% (01 Jun 2022 05:08) (95% - 96%)      RADIOLOGY    MICROBIOLOGY      PHYSICAL EXAM  Lower Extremity Focused:  *Limited due to pt noncompliance w/ in depth exam 2/2 severe tenderness to wounds*  Vasc: 2/4 DP PT to R. 2/4 DP Left, unable to assess PT 2/2 proximity of ulcer and severe tenderness to touch. Diffuse erythema present to LLE  Derm: LLE: Superficial distal anterior leg wound present, fibrogranular base w/ intact borders. Superficial medial mal wounds x 2 present, fibrous base w/ large eschar. Serous drainage w/ malodor present. Unable to asses PTB 2/2 pt pain and refusal.   Neuro: Unable to assess  MSK: +severe TTP of ulcers. Unable to assess muscle strength.         
Patient is a 77y old  Female who presents with a chief complaint of Leg Cellulitis (30 May 2022 11:38)      INTERVAL HPI/ OVERNIGHT EVENTS: Pt evaluated at bedside. Continues to endorse severe tenderness. Pt insists on removing and touching her dressings with her bare hands, will also blow on wounds with her mouth despite instructed her otherwise. Educated pt she is at risk of spreading bacteria from her fingers and mouth by touching her wounds in this manner; pt does not seem to understand noting that "its already dirty anyways".       LABS                        11.5   6.16  )-----------( 466      ( 31 May 2022 07:09 )             36.7     05-30    134<L>  |  102  |  17  ----------------------------<  104<H>  3.9   |  23  |  0.70    Ca    9.1      30 May 2022 06:26  Phos  3.9     05-30  Mg     2.0     05-30    TPro  7.2  /  Alb  3.4  /  TBili  0.4  /  DBili  x   /  AST  16  /  ALT  18  /  AlkPhos  120  05-30    PT/INR - ( 29 May 2022 19:01 )   PT: 13.0 sec;   INR: 1.09          PTT - ( 29 May 2022 19:01 )  PTT:28.5 sec    ICU Vital Signs Last 24 Hrs  T(C): 37.2 (31 May 2022 05:54), Max: 37.2 (31 May 2022 05:54)  T(F): 99 (31 May 2022 05:54), Max: 99 (31 May 2022 05:54)  HR: 72 (31 May 2022 05:54) (72 - 79)  BP: 122/79 (31 May 2022 05:54) (110/65 - 122/79)  BP(mean): --  ABP: --  ABP(mean): --  RR: 17 (31 May 2022 05:54) (16 - 18)  SpO2: 95% (31 May 2022 05:54) (95% - 97%)      RADIOLOGY    MICROBIOLOGY    PHYSICAL EXAM  Lower Extremity Focused:  *Limited due to pt noncompliance w/ in depth exam 2/2 severe tenderness to wounds*  Vasc: 2/4 DP PT to R. 2/4 DP Left, unable to assess PT 2/2 proximity of ulcer and severe tenderness to touch. Diffuse erythema present to LLE  Derm: LLE: Superficial distal anterior leg wound present, fibrogranular base w/ intact borders. Superficial medial mal wounds x 2 present, fibrous base w/ large eschar. Serous drainage w/ malodor present. Unable to asses PTB 2/2 pt pain and refusal.   Neuro: Unable to assess  MSK: +severe TTP of ulcers. Unable to assess muscle strength. 
SUBJECTIVE / INTERVAL HPI: Patient seen and examined at bedside. Patient refusing to be examined this morning.     VITAL SIGNS:  Vital Signs Last 24 Hrs  T(C): 36.5 (30 May 2022 05:01), Max: 36.9 (29 May 2022 18:09)  T(F): 97.7 (30 May 2022 05:01), Max: 98.5 (29 May 2022 18:09)  HR: 68 (30 May 2022 05:01) (68 - 106)  BP: 105/69 (30 May 2022 05:01) (105/69 - 157/89)  BP(mean): --  RR: 18 (30 May 2022 05:01) (16 - 18)  SpO2: 95% (30 May 2022 05:01) (95% - 99%)    PHYSICAL EXAM: refusing to be examined     MEDICATIONS:  MEDICATIONS  (STANDING):  diphenhydrAMINE 25 milliGRAM(s) Oral every 12 hours  enoxaparin Injectable 40 milliGRAM(s) SubCutaneous every 24 hours  famotidine    Tablet 20 milliGRAM(s) Oral daily  influenza  Vaccine (HIGH DOSE) 0.7 milliLiter(s) IntraMuscular once  piperacillin/tazobactam IVPB.. 4.5 Gram(s) IV Intermittent every 6 hours  vancomycin  IVPB 1000 milliGRAM(s) IV Intermittent every 12 hours    MEDICATIONS  (PRN):  acetaminophen     Tablet .. 650 milliGRAM(s) Oral every 6 hours PRN Mild Pain (1 - 3)  morphine  - Injectable 1 milliGRAM(s) IV Push every 4 hours PRN Severe Pain (7 - 10)  morphine  - Injectable 0.5 milliGRAM(s) IV Push every 2 hours PRN Severe Pain (7 - 10)      ALLERGIES:  Allergies    vancomycin (Rash)    Intolerances        LABS:                        10.7   8.66  )-----------( 401      ( 30 May 2022 06:26 )             33.9     05-30    134<L>  |  102  |  17  ----------------------------<  104<H>  3.9   |  23  |  0.70    Ca    9.1      30 May 2022 06:26  Phos  3.9     05-30  Mg     2.0     05-30    TPro  7.2  /  Alb  3.4  /  TBili  0.4  /  DBili  x   /  AST  16  /  ALT  18  /  AlkPhos  120  05-30    PT/INR - ( 29 May 2022 19:01 )   PT: 13.0 sec;   INR: 1.09          PTT - ( 29 May 2022 19:01 )  PTT:28.5 sec    CAPILLARY BLOOD GLUCOSE          RADIOLOGY & ADDITIONAL TESTS: Reviewed.    ASSESSMENT:    PLAN: 
SUBJECTIVE / INTERVAL HPI: Patient seen and examined at bedside. No new active complaints.    VITAL SIGNS:  Vital Signs Last 24 Hrs  T(C): 36.5 (30 May 2022 05:01), Max: 36.9 (29 May 2022 18:09)  T(F): 97.7 (30 May 2022 05:01), Max: 98.5 (29 May 2022 18:09)  HR: 68 (30 May 2022 05:01) (68 - 106)  BP: 105/69 (30 May 2022 05:01) (105/69 - 157/89)  BP(mean): --  RR: 18 (30 May 2022 05:01) (16 - 18)  SpO2: 95% (30 May 2022 05:01) (95% - 99%)    PHYSICAL EXAM: refusing to be examined     MEDICATIONS:  MEDICATIONS  (STANDING):  diphenhydrAMINE 25 milliGRAM(s) Oral every 12 hours  enoxaparin Injectable 40 milliGRAM(s) SubCutaneous every 24 hours  famotidine    Tablet 20 milliGRAM(s) Oral daily  influenza  Vaccine (HIGH DOSE) 0.7 milliLiter(s) IntraMuscular once  piperacillin/tazobactam IVPB.. 4.5 Gram(s) IV Intermittent every 6 hours  vancomycin  IVPB 1000 milliGRAM(s) IV Intermittent every 12 hours    MEDICATIONS  (PRN):  acetaminophen     Tablet .. 650 milliGRAM(s) Oral every 6 hours PRN Mild Pain (1 - 3)  morphine  - Injectable 1 milliGRAM(s) IV Push every 4 hours PRN Severe Pain (7 - 10)  morphine  - Injectable 0.5 milliGRAM(s) IV Push every 2 hours PRN Severe Pain (7 - 10)      ALLERGIES:  Allergies    vancomycin (Rash)    Intolerances        LABS:                        10.7   8.66  )-----------( 401      ( 30 May 2022 06:26 )             33.9     05-30    134<L>  |  102  |  17  ----------------------------<  104<H>  3.9   |  23  |  0.70    Ca    9.1      30 May 2022 06:26  Phos  3.9     05-30  Mg     2.0     05-30    TPro  7.2  /  Alb  3.4  /  TBili  0.4  /  DBili  x   /  AST  16  /  ALT  18  /  AlkPhos  120  05-30    PT/INR - ( 29 May 2022 19:01 )   PT: 13.0 sec;   INR: 1.09          PTT - ( 29 May 2022 19:01 )  PTT:28.5 sec    CAPILLARY BLOOD GLUCOSE          RADIOLOGY & ADDITIONAL TESTS: Reviewed.    ASSESSMENT:    PLAN:

## 2022-06-02 LAB — CULTURE RESULTS: SIGNIFICANT CHANGE UP

## 2022-06-03 LAB
CULTURE RESULTS: SIGNIFICANT CHANGE UP
CULTURE RESULTS: SIGNIFICANT CHANGE UP
SPECIMEN SOURCE: SIGNIFICANT CHANGE UP
SPECIMEN SOURCE: SIGNIFICANT CHANGE UP

## 2022-06-06 DIAGNOSIS — F17.210 NICOTINE DEPENDENCE, CIGARETTES, UNCOMPLICATED: ICD-10-CM

## 2022-06-06 DIAGNOSIS — L03.116 CELLULITIS OF LEFT LOWER LIMB: ICD-10-CM

## 2022-06-06 DIAGNOSIS — F31.9 BIPOLAR DISORDER, UNSPECIFIED: ICD-10-CM

## 2022-06-06 DIAGNOSIS — M50.20 OTHER CERVICAL DISC DISPLACEMENT, UNSPECIFIED CERVICAL REGION: ICD-10-CM

## 2022-06-06 DIAGNOSIS — M81.0 AGE-RELATED OSTEOPOROSIS WITHOUT CURRENT PATHOLOGICAL FRACTURE: ICD-10-CM

## 2022-06-06 DIAGNOSIS — L97.929 NON-PRESSURE CHRONIC ULCER OF UNSPECIFIED PART OF LEFT LOWER LEG WITH UNSPECIFIED SEVERITY: ICD-10-CM

## 2022-06-06 DIAGNOSIS — F43.22 ADJUSTMENT DISORDER WITH ANXIETY: ICD-10-CM

## 2022-06-06 DIAGNOSIS — I87.2 VENOUS INSUFFICIENCY (CHRONIC) (PERIPHERAL): ICD-10-CM

## 2022-06-06 DIAGNOSIS — I80.9 PHLEBITIS AND THROMBOPHLEBITIS OF UNSPECIFIED SITE: ICD-10-CM

## 2023-03-02 NOTE — PATIENT PROFILE ADULT - FUNCTIONAL ASSESSMENT - BASIC MOBILITY SCORE.
Patient called again asking if script for test strips been called in yet        Frequency test 3 times a day  - pt asked me to add         [id number] - [place] - [phone]

## 2023-07-19 NOTE — PROGRESS NOTE ADULT - PROBLEM SELECTOR PLAN 5
"Subjective   Reason for Visit: Demi Ayers is an 88 y.o. female here for a Medicare Wellness visit.     Past Medical, Surgical, and Family History reviewed and updated in chart.    Reviewed all medications by prescribing practitioner or clinical pharmacist (such as prescriptions, OTCs, herbal therapies and supplements) and documented in the medical record.    HPI    Patient Care Team:  Zenobia Calderon MD as PCP - General  Zenobia Calderon MD as PCP - [facility] Attributed Provider     Review of Systems    Objective   Vitals:  /72   Pulse 76   Temp 36.3 °C (97.4 °F) (Temporal)   Resp 16   Ht 1.575 m (5' 2\")   Wt 72.1 kg (159 lb)   SpO2 98%   BMI 29.08 kg/m²       Physical Exam    Assessment/Plan   Problem List Items Addressed This Visit     Chronic obstructive pulmonary disease (CMS/HCC)    Essential hypertension    History of TIA (transient ischemic attack)    Hyperlipidemia    Mild intermittent asthma    Osteopenia    Acquired hypothyroidism    Peripheral vascular disease of foot (CMS/HCC)    PFO (patent foramen ovale)    Prediabetes   Other Visit Diagnoses     Routine general medical examination at health care facility    -  Primary    Encounter for Medicare annual wellness exam        Frequent UTI        Relevant Orders    Urine Culture    POCT UA Automated manually resulted             "
F: None   E: replet as needed   N: regular   DVT ppx: Hold Xarelto pending surgical eval   GI ppx: Famotidine  Dispo: [facility]    GOC: FULL CODE in the chart from nursing home but pt requested to review MOLTS to make a decission for being DNR/DNI. Patient is not reliable to decesion and right now pt is FULL CODE until sign the MOLTS and being seen by psych in AM.
F: None   E: replet as needed   N: regular   DVT ppx: Hold Xarelto pending surgical eval   GI ppx: Famotidine  Dispo: [facility]    GOC: FULL CODE in the chart from nursing home but pt requested to review MOLTS to make a decission for being DNR/DNI. Patient is not reliable to decesion and right now pt is FULL CODE until sign the MOLTS and being seen by psych in AM.

## 2024-01-12 NOTE — PROGRESS NOTE ADULT - PROBLEM/PLAN-5
Medication:   Requested Prescriptions     Pending Prescriptions Disp Refills    hydroCHLOROthiazide (HYDRODIURIL) 12.5 MG tablet [Pharmacy Med Name: hydroCHLOROthiazide 12.5 MG TABLET] 90 tablet 0     Sig: TAKE 1 TABLET BY MOUTH DAILY        Last Filled:  10/13/23    Patient Phone Number: [phone] (home) [phone] (work)    Last appt: 10/16/2023   Next appt: Visit date not found    Last OARRS:       10/16/2023    11:47 AM   RX Monitoring   Periodic Controlled Substance Monitoring No signs of potential drug abuse or diversion identified.         
DISPLAY PLAN FREE TEXT
DISPLAY PLAN FREE TEXT

## 2024-03-11 NOTE — ED PROVIDER NOTE - NSFOLLOWUPINSTRUCTIONS_ED_ALL_ED_FT
Care of Cryotherapy Site  Today you had something frozen, an Actinic Keratosis.  These skin growths are destroyed by the freezing action.  The following is what to expect:   Within a few hours to a few days after treatment the area may blister, turn black, or   form a scab. This is a desirable result.   Most patients experience little or no pain from this treatment. If you do experience pain,   you may take aspirin (adults only), ibuprofen or acetaminophen.   Apply plain petroleum jelly to site(s) daily until healed if needed. No bandage is   necessary.   You may drain a blister to help relieve pressure if advised by your doctor.   The scab will fall off as the area heals. Do not pick at scab, let it fall off naturally. It will   take several days to weeks for the scab to fall off depending on the size of the treated   area, size of lesion, type of lesion treated, and your body’s healing ability.   The underlying skin may be red, sensitive to temperature, touch and may possibly itch   as it heals. Normal skin color will eventually return.    Take Ibuprofen 600mg every 6-8 hours as needed for pain, take with food, and in addition you may take Tylenol 500 mg every 6-8 hours as needed for pain - over the counter  Rest. Cool compresses.      FOLLOW UP WITH PAIN MANAGEMENT FOR FURTHER MANAGEMENT    RETURN TO THE EMERGENCY DEPARTMENT FOR WORSENING PAIN, NUMBNESS, WEAKNESS, CHEST PAIN OR ANY CONCERNS.